# Patient Record
Sex: MALE | Race: WHITE | Employment: OTHER | URBAN - METROPOLITAN AREA
[De-identification: names, ages, dates, MRNs, and addresses within clinical notes are randomized per-mention and may not be internally consistent; named-entity substitution may affect disease eponyms.]

---

## 2019-08-06 ENCOUNTER — DOCUMENTATION ONLY (OUTPATIENT)
Dept: OTHER | Facility: CLINIC | Age: 64
End: 2019-08-06

## 2019-08-06 ENCOUNTER — NURSING HOME VISIT (OUTPATIENT)
Dept: GERIATRICS | Facility: CLINIC | Age: 64
End: 2019-08-06
Payer: MEDICARE

## 2019-08-06 VITALS
OXYGEN SATURATION: 95 % | TEMPERATURE: 98.5 F | RESPIRATION RATE: 24 BRPM | DIASTOLIC BLOOD PRESSURE: 69 MMHG | HEART RATE: 80 BPM | WEIGHT: 169.7 LBS | SYSTOLIC BLOOD PRESSURE: 118 MMHG

## 2019-08-06 DIAGNOSIS — J44.9 COPD, SEVERE (H): Primary | ICD-10-CM

## 2019-08-06 DIAGNOSIS — Z79.4 TYPE 2 DIABETES MELLITUS WITHOUT COMPLICATION, WITH LONG-TERM CURRENT USE OF INSULIN (H): ICD-10-CM

## 2019-08-06 DIAGNOSIS — G47.00 INSOMNIA, UNSPECIFIED TYPE: ICD-10-CM

## 2019-08-06 DIAGNOSIS — I10 ESSENTIAL HYPERTENSION: ICD-10-CM

## 2019-08-06 DIAGNOSIS — E11.9 TYPE 2 DIABETES MELLITUS WITHOUT COMPLICATION, WITH LONG-TERM CURRENT USE OF INSULIN (H): ICD-10-CM

## 2019-08-06 DIAGNOSIS — Z51.5 HOSPICE CARE PATIENT: ICD-10-CM

## 2019-08-06 DIAGNOSIS — F10.11 HISTORY OF ETOH ABUSE: ICD-10-CM

## 2019-08-06 DIAGNOSIS — Z72.0 TOBACCO ABUSE: ICD-10-CM

## 2019-08-06 DIAGNOSIS — J18.9 PNEUMONIA OF RIGHT LUNG DUE TO INFECTIOUS ORGANISM, UNSPECIFIED PART OF LUNG: ICD-10-CM

## 2019-08-06 PROCEDURE — 99309 SBSQ NF CARE MODERATE MDM 30: CPT | Mod: GW | Performed by: NURSE PRACTITIONER

## 2019-08-06 RX ORDER — DEXAMETHASONE 0.5 MG/1
0.5 TABLET ORAL 2 TIMES DAILY WITH MEALS
COMMUNITY
Start: 2019-08-02 | End: 2019-08-15

## 2019-08-06 RX ORDER — CEFUROXIME AXETIL 500 MG/1
500 TABLET ORAL 2 TIMES DAILY
COMMUNITY
End: 2019-08-15

## 2019-08-06 RX ORDER — ALBUTEROL SULFATE 90 UG/1
1 AEROSOL, METERED RESPIRATORY (INHALATION) EVERY 6 HOURS PRN
COMMUNITY

## 2019-08-06 RX ORDER — DEXAMETHASONE 0.5 MG/1
0.5 TABLET ORAL EVERY OTHER DAY
COMMUNITY
Start: 2019-11-11 | End: 2020-09-02

## 2019-08-06 RX ORDER — GUAIFENESIN 600 MG/1
1200 TABLET, EXTENDED RELEASE ORAL 2 TIMES DAILY PRN
COMMUNITY

## 2019-08-06 RX ORDER — CIPROFLOXACIN 750 MG/1
750 TABLET, FILM COATED ORAL 2 TIMES DAILY
COMMUNITY
End: 2019-08-15

## 2019-08-06 RX ORDER — LORAZEPAM 0.5 MG/1
0.5 TABLET ORAL 2 TIMES DAILY PRN
COMMUNITY
End: 2019-08-15

## 2019-08-06 RX ORDER — TRAZODONE HYDROCHLORIDE 50 MG/1
TABLET, FILM COATED ORAL
COMMUNITY

## 2019-08-06 RX ORDER — FUROSEMIDE 40 MG
40 TABLET ORAL 2 TIMES DAILY
COMMUNITY

## 2019-08-06 RX ORDER — POTASSIUM CHLORIDE 750 MG/1
10 TABLET, EXTENDED RELEASE ORAL DAILY
COMMUNITY
End: 2019-08-15

## 2019-08-06 RX ORDER — LISINOPRIL 40 MG/1
40 TABLET ORAL DAILY
COMMUNITY

## 2019-08-06 RX ORDER — LORAZEPAM 2 MG/ML
2 CONCENTRATE ORAL EVERY 4 HOURS PRN
COMMUNITY
End: 2019-08-15

## 2019-08-06 RX ORDER — ALBUTEROL SULFATE 90 UG/1
1 AEROSOL, METERED RESPIRATORY (INHALATION) EVERY 4 HOURS PRN
COMMUNITY

## 2019-08-06 RX ORDER — BISACODYL 10 MG
10 SUPPOSITORY, RECTAL RECTAL DAILY PRN
COMMUNITY

## 2019-08-06 NOTE — LETTER
8/6/2019        RE: Brooks Loo  [address] [facility] [address]        [place] [facility]  PRIMARY CARE PROVIDER AND CLINIC:  No primary care provider on file., No primary physician on file.  Chief Complaint   Patient presents with     [facility] F/U     [facility] Medical Record Number:  [id number]  Place of Service where encounter took place:  [facility] ([facility]) [[id number]]    Brooks Loo  is a 64 year old  (1955), [facility].  Admitted to this facility for  rehab, medical management, nursing care and hospice.    HPI:    HPI information obtained from: facility chart records, facility staff, patient report and Care Everywhere Epic chart review.     BRIEF HOSPITAL COURSE: Brooks Loo is a 64 y.o. male with history of end-stage oxygen and steroid dependent COPD, thrombocytopenia, hypertension, anxiety and depression pancreatitis and elevated liver enzymes who was [place] [facility] [place] with complaints of increased shortness of breath and right sided abdominal pain since his recent discharge, and despite ongoing Levaquin and Decadron.  Diagnosed with hospital acquired pneumonia with COPD exacerbation. Was discharged with ceftin, cipro and fluconazole.     Patient admitted to LTC on [facility] hospice.   Met with patient in his room today. He is alert and talkative.   He feels his breathing is stable.   His main concern is insomnia. This is chronic, but worse since hospitalization. He would like something to help with this. He reports chronic joint pain due to arthritis. Pain is at baseline. No other concerns today.         CODE STATUS/ADVANCE DIRECTIVES DISCUSSION:   DNR / DNI  Patient's living condition: lives with spouse  ALLERGIES: Cephalexin; Duoneb; and Prednisone  PAST MEDICAL HISTORY:  has no past medical history on file.  PAST SURGICAL HISTORY:   has no past surgical history on file.  FAMILY HISTORY: family history is not on  file.  SOCIAL HISTORY:       Post Discharge Medication Reconciliation Status: discharge medications reconciled, continue medications without change    Current Outpatient Medications   Medication Sig Dispense Refill     albuterol (PROAIR HFA/PROVENTIL HFA/VENTOLIN HFA) 108 (90 Base) MCG/ACT inhaler Inhale 1 puff into the lungs every 4 hours as needed for shortness of breath / dyspnea or wheezing       albuterol (PROAIR HFA/PROVENTIL HFA/VENTOLIN HFA) 108 (90 Base) MCG/ACT inhaler Inhale 1 puff into the lungs every 6 hours as needed for shortness of breath / dyspnea or wheezing       bisacodyl (DULCOLAX) 10 MG suppository Place 10 mg rectally daily as needed for constipation       cefuroxime (CEFTIN) 500 MG tablet Take 500 mg by mouth 2 times daily       ciprofloxacin (CIPRO) 750 MG tablet Take 750 mg by mouth 2 times daily       dexamethasone (DECADRON) 0.5 MG tablet Take 0.5 mg by mouth daily (with breakfast)       dexamethasone (DECADRON) 0.5 MG tablet Take 0.5 mg by mouth 2 times daily (with meals)       FLUCONAZOLE PO Take 400 mg by mouth daily       fluticasone-vilanterol (BREO ELLIPTA) 200-25 MCG/INH inhaler Inhale 1 puff into the lungs daily       furosemide (LASIX) 40 MG tablet Take 40 mg by mouth daily       guaiFENesin (MUCINEX) 600 MG 12 hr tablet Take 1,200 mg by mouth 2 times daily       insulin aspart (NOVOLOG FLEXPEN) 100 UNIT/ML pen Inject 8 Units Subcutaneous 3 times daily (with meals)       insulin aspart (NOVOLOG FLEXPEN) 100 UNIT/ML pen Inject as per sliding scale: if 0 - 69 = see protoc See  hypoglycemia protocol; 70 - 149 = 0 units; 150 - 199 =  2 units; 200 - 249 = 4 units; 250 - 299 = 6 units; 300 -  349 = 8 units; 350 - 399 = 10 units; 400 - 999 = 12  units &call MD, subcutaneously before meals       insulin glargine (LANTUS PEN) 100 UNIT/ML pen Inject 15 Units Subcutaneous daily       lisinopril (PRINIVIL/ZESTRIL) 40 MG tablet Take 40 mg by mouth daily       LORazepam (ATIVAN) 0.5 MG tablet  Take 0.5 mg by mouth 2 times daily as needed for anxiety       LORazepam (LORAZEPAM INTENSOL) 2 MG/ML (HIGH CONC) solution Take 2 mg by mouth every 4 hours as needed for anxiety       magnesium hydroxide (MILK OF MAGNESIA) 400 MG/5ML suspension Take 30 mLs by mouth daily as needed for constipation or heartburn       nicotine (NICOTROL) 10 MG inhaler Inhale 1 Cartridge into the lungs every hour as needed for smoking cessation       potassium chloride ER (K-DUR/KLOR-CON M) 10 MEQ CR tablet Take 10 mEq by mouth daily Every Mon, Wed, Fri       tiotropium (SPIRIVA RESPIMAT) 2.5 MCG/ACT inhaler Inhale 2 puffs into the lungs daily       traZODone (DESYREL) 50 MG tablet Take 50 mg by mouth At Bedtime       Tuberculin PPD (TUBERSOL ID) Inject 0.1 ml intradermally every evening  shift for give mantoux step 2         ROS:  4 point ROS including Respiratory, CV, GI and , other than that noted in the HPI,  is negative    Vitals:  /69   Pulse 80   Temp 98.5  F (36.9  C)   Resp 24   Wt 77 kg (169 lb 11.2 oz)   SpO2 95%   Exam:  GENERAL APPEARANCE:  Alert, in no distress  RESP:  respiratory effort and palpation of chest normal, diminished breath sounds t/o  CV:  Palpation and auscultation of heart done , regular rate and rhythm, no murmur, rub, or gallop  ABDOMEN:  no guarding or rebound, bowel sounds normal  SKIN:  Inspection of skin and subcutaneous tissue baseline  PSYCH:  oriented X 3, affect and mood normal    Lab/Diagnostic data:      ASSESSMENT/PLAN:  COPD, severe (H)  Pneumonia of right lung due to infectious organism, unspecified part of lung  Insomnia, unspecified type  Hospice care patient  - multiple hospitalizations d/t to end stage lung disease. Has started on [facility] hospice and does not desire future hospitalizations.  - on continuous oxgen. Continue current med/ abx  regimen  - Not sleeping well due to air hunger. Will have a sleep log obtained. Consider increasing ativan or morphine at HS to aid with  air hunger    Tobacco abuse  - continues to smoke, Patient understands he should quit    Essential hypertension  - vitals stable, hx of hypokalemia  - continue ACE and KCL, will check labs    Type 2 diabetes mellitus without complication, with long-term current use of insulin (H)  - hyperglycemia d/t steroids  - A1C 7.6% 8/1/19  - continue insulin and BG checks    History of ETOH abuse  - sober x 8 years    transcribed by : Dari Kim  Orders:  1. Keep sleep log x 7 days  2. Update Hospice RN if pt c/o insomnia  3. BMP - dx: edema, hypokalemia      Electronically signed by:  GUERDA Lopez CNP                       Sincerely,        GUERDA Lopez CNP

## 2019-08-06 NOTE — PROGRESS NOTES
[place] [facility]  PRIMARY CARE PROVIDER AND CLINIC:  No primary care provider on file., No primary physician on file.  Chief Complaint   Patient presents with     Hospital F/U     Establish Care     [place] Medical Record Number:  [id number]  Place of Service where encounter took place:  [facility] ([facility]) [[id number]]    Brooks Loo  is a 64 year old  (1955), [facility].  Admitted to this facility for  rehab, medical management, nursing care and hospice.    HPI:    HPI information obtained from: facility chart records, facility staff, patient report and Care Everywhere Epic chart review.     BRIEF HOSPITAL COURSE: Brooks Loo is a 64 y.o. male with history of end-stage oxygen and steroid dependent COPD, thrombocytopenia, hypertension, anxiety and depression pancreatitis and elevated liver enzymes who was readmitted to [facility] 7/27/19 with complaints of increased shortness of breath and right sided abdominal pain since his recent discharge, and despite ongoing Levaquin and Decadron.  Diagnosed with hospital acquired pneumonia with COPD exacerbation. Was discharged with ceftin, cipro and fluconazole.     Patient admitted to LTC on [facility] hospice.   Met with patient in his room today. He is alert and talkative.   He feels his breathing is stable.   His main concern is insomnia. This is chronic, but worse since hospitalization. He would like something to help with this. He reports chronic joint pain due to arthritis. Pain is at baseline. No other concerns today.         CODE STATUS/ADVANCE DIRECTIVES DISCUSSION:   DNR / DNI  Patient's living condition: lives with spouse  ALLERGIES: Cephalexin; Duoneb; and Prednisone  PAST MEDICAL HISTORY:  has no past medical history on file.  PAST SURGICAL HISTORY:   has no past surgical history on file.  FAMILY HISTORY: family history is not on file.  SOCIAL HISTORY:       Post Discharge Medication Reconciliation Status: discharge  medications reconciled, continue medications without change    Current Outpatient Medications   Medication Sig Dispense Refill     albuterol (PROAIR HFA/PROVENTIL HFA/VENTOLIN HFA) 108 (90 Base) MCG/ACT inhaler Inhale 1 puff into the lungs every 4 hours as needed for shortness of breath / dyspnea or wheezing       albuterol (PROAIR HFA/PROVENTIL HFA/VENTOLIN HFA) 108 (90 Base) MCG/ACT inhaler Inhale 1 puff into the lungs every 6 hours as needed for shortness of breath / dyspnea or wheezing       bisacodyl (DULCOLAX) 10 MG suppository Place 10 mg rectally daily as needed for constipation       cefuroxime (CEFTIN) 500 MG tablet Take 500 mg by mouth 2 times daily       ciprofloxacin (CIPRO) 750 MG tablet Take 750 mg by mouth 2 times daily       dexamethasone (DECADRON) 0.5 MG tablet Take 0.5 mg by mouth daily (with breakfast)       dexamethasone (DECADRON) 0.5 MG tablet Take 0.5 mg by mouth 2 times daily (with meals)       FLUCONAZOLE PO Take 400 mg by mouth daily       fluticasone-vilanterol (BREO ELLIPTA) 200-25 MCG/INH inhaler Inhale 1 puff into the lungs daily       furosemide (LASIX) 40 MG tablet Take 40 mg by mouth daily       guaiFENesin (MUCINEX) 600 MG 12 hr tablet Take 1,200 mg by mouth 2 times daily       insulin aspart (NOVOLOG FLEXPEN) 100 UNIT/ML pen Inject 8 Units Subcutaneous 3 times daily (with meals)       insulin aspart (NOVOLOG FLEXPEN) 100 UNIT/ML pen Inject as per sliding scale: if 0 - 69 = see protoc See  hypoglycemia protocol; 70 - 149 = 0 units; 150 - 199 =  2 units; 200 - 249 = 4 units; 250 - 299 = 6 units; 300 -  349 = 8 units; 350 - 399 = 10 units; 400 - 999 = 12  units &call MD, subcutaneously before meals       insulin glargine (LANTUS PEN) 100 UNIT/ML pen Inject 15 Units Subcutaneous daily       lisinopril (PRINIVIL/ZESTRIL) 40 MG tablet Take 40 mg by mouth daily       LORazepam (ATIVAN) 0.5 MG tablet Take 0.5 mg by mouth 2 times daily as needed for anxiety       LORazepam (LORAZEPAM  INTENSOL) 2 MG/ML (HIGH CONC) solution Take 2 mg by mouth every 4 hours as needed for anxiety       magnesium hydroxide (MILK OF MAGNESIA) 400 MG/5ML suspension Take 30 mLs by mouth daily as needed for constipation or heartburn       nicotine (NICOTROL) 10 MG inhaler Inhale 1 Cartridge into the lungs every hour as needed for smoking cessation       potassium chloride ER (K-DUR/KLOR-CON M) 10 MEQ CR tablet Take 10 mEq by mouth daily Every Mon, Wed, Fri       tiotropium (SPIRIVA RESPIMAT) 2.5 MCG/ACT inhaler Inhale 2 puffs into the lungs daily       traZODone (DESYREL) 50 MG tablet Take 50 mg by mouth At Bedtime       Tuberculin PPD (TUBERSOL ID) Inject 0.1 ml intradermally every evening  shift for give mantoux step 2         ROS:  4 point ROS including Respiratory, CV, GI and , other than that noted in the HPI,  is negative    Vitals:  /69   Pulse 80   Temp 98.5  F (36.9  C)   Resp 24   Wt 77 kg (169 lb 11.2 oz)   SpO2 95%   Exam:  GENERAL APPEARANCE:  Alert, in no distress  RESP:  respiratory effort and palpation of chest normal, diminished breath sounds t/o  CV:  Palpation and auscultation of heart done , regular rate and rhythm, no murmur, rub, or gallop  ABDOMEN:  no guarding or rebound, bowel sounds normal  SKIN:  Inspection of skin and subcutaneous tissue baseline  PSYCH:  oriented X 3, affect and mood normal    Lab/Diagnostic data:      ASSESSMENT/PLAN:  COPD, severe (H)  Pneumonia of right lung due to infectious organism, unspecified part of lung  Insomnia, unspecified type  Hospice care patient  - multiple hospitalizations d/t to end stage lung disease. Has started on [facility] hospice and does not desire future hospitalizations.  - on continuous oxgen. Continue current med/ abx  regimen  - Not sleeping well due to air hunger. Will have a sleep log obtained. Consider increasing ativan or morphine at HS to aid with air hunger    Tobacco abuse  - continues to smoke, Patient understands he should  quit    Essential hypertension  - vitals stable, hx of hypokalemia  - continue ACE and KCL, will check labs    Type 2 diabetes mellitus without complication, with long-term current use of insulin (H)  - hyperglycemia d/t steroids  - A1C 7.6% 8/1/19  - continue insulin and BG checks    History of ETOH abuse  - sober x 8 years    transcribed by : Dari Kim  Orders:  1. Keep sleep log x 7 days  2. Update Hospice RN if pt c/o insomnia  3. BMP - dx: edema, hypokalemia      Electronically signed by:  GUERDA Lopez CNP

## 2019-08-08 ASSESSMENT — MIFFLIN-ST. JEOR: SCORE: 1513.41

## 2019-08-09 ENCOUNTER — HOSPITAL LABORATORY (OUTPATIENT)
Facility: OTHER | Age: 64
End: 2019-08-09

## 2019-08-09 LAB
ANION GAP SERPL CALCULATED.3IONS-SCNC: 6 MMOL/L (ref 3–14)
BUN SERPL-MCNC: 30 MG/DL (ref 7–30)
CALCIUM SERPL-MCNC: 9.3 MG/DL (ref 8.5–10.1)
CHLORIDE SERPL-SCNC: 99 MMOL/L (ref 94–109)
CO2 SERPL-SCNC: 31 MMOL/L (ref 20–32)
CREAT SERPL-MCNC: 0.78 MG/DL (ref 0.66–1.25)
GFR SERPL CREATININE-BSD FRML MDRD: >90 ML/MIN/{1.73_M2}
GLUCOSE SERPL-MCNC: 138 MG/DL (ref 70–99)
POTASSIUM SERPL-SCNC: 4.1 MMOL/L (ref 3.4–5.3)
SODIUM SERPL-SCNC: 136 MMOL/L (ref 133–144)

## 2019-08-12 ENCOUNTER — NURSING HOME VISIT (OUTPATIENT)
Dept: GERIATRICS | Facility: CLINIC | Age: 64
End: 2019-08-12
Payer: COMMERCIAL

## 2019-08-12 VITALS
HEIGHT: 66 IN | DIASTOLIC BLOOD PRESSURE: 62 MMHG | RESPIRATION RATE: 16 BRPM | OXYGEN SATURATION: 93 % | HEART RATE: 72 BPM | BODY MASS INDEX: 27.31 KG/M2 | TEMPERATURE: 98.5 F | WEIGHT: 169.9 LBS | SYSTOLIC BLOOD PRESSURE: 118 MMHG

## 2019-08-12 DIAGNOSIS — I10 ESSENTIAL HYPERTENSION: ICD-10-CM

## 2019-08-12 DIAGNOSIS — G47.00 INSOMNIA, UNSPECIFIED TYPE: ICD-10-CM

## 2019-08-12 DIAGNOSIS — J44.9 COPD, SEVERE (H): ICD-10-CM

## 2019-08-12 DIAGNOSIS — E11.9 TYPE 2 DIABETES MELLITUS WITHOUT COMPLICATION, WITH LONG-TERM CURRENT USE OF INSULIN (H): Primary | ICD-10-CM

## 2019-08-12 DIAGNOSIS — Z79.4 TYPE 2 DIABETES MELLITUS WITHOUT COMPLICATION, WITH LONG-TERM CURRENT USE OF INSULIN (H): Primary | ICD-10-CM

## 2019-08-12 DIAGNOSIS — Z51.5 HOSPICE CARE PATIENT: ICD-10-CM

## 2019-08-12 DIAGNOSIS — J96.10 CHRONIC RESPIRATORY FAILURE, UNSPECIFIED WHETHER WITH HYPOXIA OR HYPERCAPNIA (H): ICD-10-CM

## 2019-08-12 DIAGNOSIS — J18.9 PNEUMONIA OF RIGHT LUNG DUE TO INFECTIOUS ORGANISM, UNSPECIFIED PART OF LUNG: ICD-10-CM

## 2019-08-12 PROCEDURE — 99305 1ST NF CARE MODERATE MDM 35: CPT | Mod: GW | Performed by: FAMILY MEDICINE

## 2019-08-12 RX ORDER — IPRATROPIUM BROMIDE AND ALBUTEROL SULFATE 2.5; .5 MG/3ML; MG/3ML
1 SOLUTION RESPIRATORY (INHALATION) 3 TIMES DAILY
COMMUNITY

## 2019-08-12 NOTE — LETTER
8/12/2019        RE: Brooks Loo  [address] [facility] [address]        [place] [facility]  PRIMARY CARE PROVIDER AND CLINIC:  No primary care provider on file., No primary physician on file.  Chief Complaint   Patient presents with     Establish Care     [place] Medical Record Number:  [id number]  Place of Service where encounter took place:  [facility] ([facility] [[id number]]    Brooks Loo  is a 64 year old  (1955), [facility].  Admitted to this facility for  rehab, medical management, nursing care and hospice.    HPI:    HPI information obtained from: facility staff, patient report and [facility] chart review.   Brief Summary of Hospital Course:   -Patient with past medical history of advanced COPD oxygen and steroid-dependent, recent history of pancreatitis, to the hospital with a pneumonia and COPD exacerbation, due to poor prognosis, enrolled in hospice.   Updates on Status Since Skilled nursing Admission:   -Was seen and examined today.  Reports cough at baseline, occasional wheezing , on O2 4 liter all the times, gets CARLIN and increased O2 to 5-6 liter, at times O2 saturation is 91% on 2 liter only.   -Hospice nurse report trazodone was increased to 100 mg help with insomnia, was taken off Spiriva, on DuoNeb 3 times daily per patient request.   ===============================================================  CODE STATUS/ADVANCE DIRECTIVES DISCUSSION:   DNR / DNI  Patient's living condition: lives with spouse  ALLERGIES: Cephalexin; Duoneb; and Prednisone   Surgical History      Surgery Date Site/Laterality Comments   GA REPAIR PDA BY DIVISION <17 Y/O     9 months old had heart surgery for patent ductus     HERNIA REPAIR     9 months old and 2 repeat surgeries after that with mesh     SPINE SURGERY 74, 75   padilla ira for scoliosis age 16     COLONOSCOPY SCREENING 3/16/05   report not available but pt reports 3 polyps and +FHx colon cancer,  "repeat 5 yrs     COLONOSCOPY SURVEILLANCE 16   Hx polyps. hyperplastic polyps at this study, repeat 5 yrs.     Medical History      Medical History Date Comments   COPD (chronic obstructive pulmonary disease) (HC)       Hypertension       Depression   \"A little bit\"    Anxiety   \"A little bit\"   Hemorrhoid       GRISEL (acute kidney injury) () 5/3/2016     Family History      Medical History Relation Name Comments   COPD Father       Cancer Father   ?   Hypertension Father       Hypertension Mother       Other Mother   ALS  6 months after diagnosis, strong female family history of ALS   Hypertension Sister Stacy     Cancer-breast Sister Baylee     Cancer-colon Sister Baylee mid 50s     Relation Name Status Comments   Brother Rashaad Alive     Father        Mother        Sister Stacy Alive     Sister Baylee Alive     Social History      Tobacco Use Types Packs/Day Years Used Date   Light Tobacco Smoker Cigarettes 0.25 32     Smokeless Tobacco: Never Used           Tobacco Cessation: Ready to Quit: Yes; Counseling Given: No  Comments: 5 cigarettes a day now started smoking at age 16     Alcohol Use Drinks/Week oz/Week Comments   Yes 2 Cans of beer    0 Standard drinks or equivalent   2.0 2 beers on the weekend (20)     Sex Assigned at Birth Date Recorded   Not on file       Job Start Date Occupation Industry   Not on file Not on file Not on file     Travel History Travel Start Travel End   No recent travel history available.       Post Discharge Medication Reconciliation Status: discharge medications reconciled and changed, per note/orders (see AVS)  Current Outpatient Medications   Medication Sig Dispense Refill     albuterol (PROAIR HFA/PROVENTIL HFA/VENTOLIN HFA) 108 (90 Base) MCG/ACT inhaler Inhale 1 puff into the lungs every 4 hours as needed for shortness of breath / dyspnea or wheezing       albuterol (PROAIR HFA/PROVENTIL HFA/VENTOLIN HFA) 108 (90 Base) MCG/ACT inhaler Inhale 1 puff into " the lungs every 6 hours as needed for shortness of breath / dyspnea or wheezing       bisacodyl (DULCOLAX) 10 MG suppository Place 10 mg rectally daily as needed for constipation       cefuroxime (CEFTIN) 500 MG tablet Take 500 mg by mouth 2 times daily       ciprofloxacin (CIPRO) 750 MG tablet Take 750 mg by mouth 2 times daily       dexamethasone (DECADRON) 0.5 MG tablet Take 0.5 mg by mouth daily (with breakfast)       dexamethasone (DECADRON) 0.5 MG tablet Take 0.5 mg by mouth 2 times daily (with meals)       FLUCONAZOLE PO Take 400 mg by mouth daily       fluticasone-vilanterol (BREO ELLIPTA) 200-25 MCG/INH inhaler Inhale 1 puff into the lungs daily       furosemide (LASIX) 40 MG tablet Take 40 mg by mouth daily       guaiFENesin (MUCINEX) 600 MG 12 hr tablet Take 1,200 mg by mouth 2 times daily       insulin aspart (NOVOLOG FLEXPEN) 100 UNIT/ML pen Inject 8 Units Subcutaneous 3 times daily (with meals)       insulin aspart (NOVOLOG FLEXPEN) 100 UNIT/ML pen Inject as per sliding scale: if 0 - 69 = see protoc See  hypoglycemia protocol; 70 - 149 = 0 units; 150 - 199 =  2 units; 200 - 249 = 4 units; 250 - 299 = 6 units; 300 -  349 = 8 units; 350 - 399 = 10 units; 400 - 999 = 12  units &call MD, subcutaneously before meals       insulin glargine (LANTUS PEN) 100 UNIT/ML pen Inject 15 Units Subcutaneous daily       lisinopril (PRINIVIL/ZESTRIL) 40 MG tablet Take 40 mg by mouth daily       LORazepam (ATIVAN) 0.5 MG tablet Take 0.5 mg by mouth 2 times daily as needed for anxiety       LORazepam (LORAZEPAM INTENSOL) 2 MG/ML (HIGH CONC) solution Take 2 mg by mouth every 4 hours as needed for anxiety       magnesium hydroxide (MILK OF MAGNESIA) 400 MG/5ML suspension Take 30 mLs by mouth daily as needed for constipation or heartburn       nicotine (NICOTROL) 10 MG inhaler Inhale 1 Cartridge into the lungs every hour as needed for smoking cessation       potassium chloride ER (K-DUR/KLOR-CON M) 10 MEQ CR tablet Take 10  "mEq by mouth daily Every Mon, Wed, Fri       tiotropium (SPIRIVA RESPIMAT) 2.5 MCG/ACT inhaler Inhale 2 puffs into the lungs daily       traZODone (DESYREL) 50 MG tablet Take 50 mg by mouth At Bedtime       Tuberculin PPD (TUBERSOL ID) Inject 0.1 ml intradermally every evening  shift for give mantoux step 2       ROS:  10 point ROS of systems including Constitutional, Eyes, Respiratory, Cardiovascular, Gastroenterology, Genitourinary, Integumentary, Musculoskeletal, Psychiatric were all negative except for pertinent positives noted in my HPI.    Vitals:  /62   Pulse 72   Temp 98.5  F (36.9  C)   Resp 16   Ht 1.676 m (5' 6\")   Wt 77.1 kg (169 lb 14.4 oz)   SpO2 93%   BMI 27.42 kg/m     Exam:  GENERAL APPEARANCE:  in no distress, cooperative  ENT:  Mouth and posterior oropharynx normal, moist mucous membranes, oral mucosa moist, no lesion noted.   EYES:  EOMI, Pupil rounded and equal.  RESP:  lungs clear to auscultation   CV:  S1S2 audible, regular HR, no murmur appreciated.   ABDOMEN:  soft, NT/ND, BS audible. no mass appreciated on palpation.   M/S:   no joint deformity noted on observation.   SKIN:  No rash.   NEURO:   No NFD appreciated on observation. Hand  5/5 b/l  PSYCH:  normal insight, judgement and memory, affect and mood normal    Lab/Diagnostic data: Reviewed in the chart and EHR.      ASSESSMENT/PLAN:  ------------------------------  Type 2 diabetes mellitus without complication, with long-term current use of insulin (H)  - HbA1C 7.6% (August 2019). Over controlled.   -  In this frail elderly adult with a limited life expectancy, the goal of  the management is to address the hyperglycemia sx if any,  rather than the  BGs numbers per se.   - on SSI, consider stopping.     COPD, severe (H)  Pneumonia of right lung due to infectious organism, unspecified part of lung  Chronic Respiratory failure (H)  - on abx, multiple regiment for COPD.     Essential hypertension: BP on the lower side. " Consider reducing lisinopril to 20 mg.     Frailty: Significant  Deficits requiring NH placement. Requiring extensive assistance from nursing. Up for meals only o/w spends the day resting in bed    Insomnia, unspecified type: on trazodone.    Hospice care patient: Symptoms managed by [facility] and Hospice Team.    Orders: See above, otherwise, continue the rest of the current POC.     Electronically signed by:  Tessa Hansen MD                       Sincerely,        Tessa Hansen MD

## 2019-08-12 NOTE — PROGRESS NOTES
[facility]  PRIMARY CARE PROVIDER AND CLINIC:  No primary care provider on file., No primary physician on file.  Chief Complaint   Patient presents with     Establish Care     [facility] Medical Record Number:  [id number]  Place of Service where encounter took place:  [facility] [[id number]]    Brooks Loo  is a 64 year old  (1955), [facility].  Admitted to this facility for  rehab, medical management, nursing care and hospice.    HPI:    HPI information obtained from: facility staff, patient report and [facility] chart review.   Brief Summary of Hospital Course:   -Patient with past medical history of advanced COPD oxygen and steroid-dependent, recent history of pancreatitis, to the hospital with a pneumonia and COPD exacerbation, due to poor prognosis, enrolled in hospice.   Updates on Status Since Skilled nursing Admission:   -Was seen and examined today.  Reports cough at baseline, occasional wheezing , on O2 4 liter all the times, gets CARLIN and increased O2 to 5-6 liter, at times O2 saturation is 91% on 2 liter only.   -Hospice nurse report trazodone was increased to 100 mg help with insomnia, was taken off Spiriva, on DuoNeb 3 times daily per patient request.   ===============================================================  CODE STATUS/ADVANCE DIRECTIVES DISCUSSION:   DNR / DNI  Patient's living condition: lives with spouse  ALLERGIES: Cephalexin; Duoneb; and Prednisone   Surgical History      Surgery Date Site/Laterality Comments   ME REPAIR PDA BY DIVISION <17 Y/O     9 months old had heart surgery for patent ductus     HERNIA REPAIR     9 months old and 2 repeat surgeries after that with mesh     SPINE SURGERY 74, 75   padilla ira for scoliosis age 16     COLONOSCOPY SCREENING 3/16/05   report not available but pt reports 3 polyps and +FHx colon cancer, repeat 5 yrs     COLONOSCOPY SURVEILLANCE 2/21/14   Hx polyps. hyperplastic polyps at  "this study, repeat 5 yrs.     Medical History      Medical History Date Comments   COPD (chronic obstructive pulmonary disease) (HC)       Hypertension       Depression   \"A little bit\"    Anxiety   \"A little bit\"   Hemorrhoid       GRISEL (acute kidney injury) (HC) 5/3/2016     Family History      Medical History Relation Name Comments   COPD Father       Cancer Father   ?   Hypertension Father       Hypertension Mother       Other Mother   ALS  6 months after diagnosis, strong female family history of ALS   Hypertension Sister Stacy     Cancer-breast Sister Baylee     Cancer-colon Sister Baylee mid 50s     Relation Name Status Comments   Brother Rashaad Alive     Father        Mother        Sister Stacy Alive     Sister Baylee Alive     Social History      Tobacco Use Types Packs/Day Years Used Date   Light Tobacco Smoker Cigarettes 0.25 32     Smokeless Tobacco: Never Used           Tobacco Cessation: Ready to Quit: Yes; Counseling Given: No  Comments: 5 cigarettes a day now started smoking at age 16     Alcohol Use Drinks/Week oz/Week Comments   Yes 2 Cans of beer    0 Standard drinks or equivalent   2.0 2 beers on the weekend (20)     Sex Assigned at Birth Date Recorded   Not on file       Job Start Date Occupation Industry   Not on file Not on file Not on file     Travel History Travel Start Travel End   No recent travel history available.       Post Discharge Medication Reconciliation Status: discharge medications reconciled and changed, per note/orders (see AVS)  Current Outpatient Medications   Medication Sig Dispense Refill     albuterol (PROAIR HFA/PROVENTIL HFA/VENTOLIN HFA) 108 (90 Base) MCG/ACT inhaler Inhale 1 puff into the lungs every 4 hours as needed for shortness of breath / dyspnea or wheezing       albuterol (PROAIR HFA/PROVENTIL HFA/VENTOLIN HFA) 108 (90 Base) MCG/ACT inhaler Inhale 1 puff into the lungs every 6 hours as needed for shortness of breath / dyspnea or wheezing       " bisacodyl (DULCOLAX) 10 MG suppository Place 10 mg rectally daily as needed for constipation       cefuroxime (CEFTIN) 500 MG tablet Take 500 mg by mouth 2 times daily       ciprofloxacin (CIPRO) 750 MG tablet Take 750 mg by mouth 2 times daily       dexamethasone (DECADRON) 0.5 MG tablet Take 0.5 mg by mouth daily (with breakfast)       dexamethasone (DECADRON) 0.5 MG tablet Take 0.5 mg by mouth 2 times daily (with meals)       FLUCONAZOLE PO Take 400 mg by mouth daily       fluticasone-vilanterol (BREO ELLIPTA) 200-25 MCG/INH inhaler Inhale 1 puff into the lungs daily       furosemide (LASIX) 40 MG tablet Take 40 mg by mouth daily       guaiFENesin (MUCINEX) 600 MG 12 hr tablet Take 1,200 mg by mouth 2 times daily       insulin aspart (NOVOLOG FLEXPEN) 100 UNIT/ML pen Inject 8 Units Subcutaneous 3 times daily (with meals)       insulin aspart (NOVOLOG FLEXPEN) 100 UNIT/ML pen Inject as per sliding scale: if 0 - 69 = see protoc See  hypoglycemia protocol; 70 - 149 = 0 units; 150 - 199 =  2 units; 200 - 249 = 4 units; 250 - 299 = 6 units; 300 -  349 = 8 units; 350 - 399 = 10 units; 400 - 999 = 12  units &call MD, subcutaneously before meals       insulin glargine (LANTUS PEN) 100 UNIT/ML pen Inject 15 Units Subcutaneous daily       lisinopril (PRINIVIL/ZESTRIL) 40 MG tablet Take 40 mg by mouth daily       LORazepam (ATIVAN) 0.5 MG tablet Take 0.5 mg by mouth 2 times daily as needed for anxiety       LORazepam (LORAZEPAM INTENSOL) 2 MG/ML (HIGH CONC) solution Take 2 mg by mouth every 4 hours as needed for anxiety       magnesium hydroxide (MILK OF MAGNESIA) 400 MG/5ML suspension Take 30 mLs by mouth daily as needed for constipation or heartburn       nicotine (NICOTROL) 10 MG inhaler Inhale 1 Cartridge into the lungs every hour as needed for smoking cessation       potassium chloride ER (K-DUR/KLOR-CON M) 10 MEQ CR tablet Take 10 mEq by mouth daily Every Mon, Wed, Fri       tiotropium (SPIRIVA RESPIMAT) 2.5 MCG/ACT  "inhaler Inhale 2 puffs into the lungs daily       traZODone (DESYREL) 50 MG tablet Take 50 mg by mouth At Bedtime       Tuberculin PPD (TUBERSOL ID) Inject 0.1 ml intradermally every evening  shift for give mantoux step 2       ROS:  10 point ROS of systems including Constitutional, Eyes, Respiratory, Cardiovascular, Gastroenterology, Genitourinary, Integumentary, Musculoskeletal, Psychiatric were all negative except for pertinent positives noted in my HPI.    Vitals:  /62   Pulse 72   Temp 98.5  F (36.9  C)   Resp 16   Ht 1.676 m (5' 6\")   Wt 77.1 kg (169 lb 14.4 oz)   SpO2 93%   BMI 27.42 kg/m    Exam:  GENERAL APPEARANCE:  in no distress, cooperative  ENT:  Mouth and posterior oropharynx normal, moist mucous membranes, oral mucosa moist, no lesion noted.   EYES:  EOMI, Pupil rounded and equal.  RESP:  lungs clear to auscultation   CV:  S1S2 audible, regular HR, no murmur appreciated.   ABDOMEN:  soft, NT/ND, BS audible. no mass appreciated on palpation.   M/S:   no joint deformity noted on observation.   SKIN:  No rash.   NEURO:   No NFD appreciated on observation. Hand  5/5 b/l  PSYCH:  normal insight, judgement and memory, affect and mood normal    Lab/Diagnostic data: Reviewed in the chart and EHR.      ASSESSMENT/PLAN:  ------------------------------  Type 2 diabetes mellitus without complication, with long-term current use of insulin (H)  - HbA1C 7.6% (August 2019). Over controlled.   -  In this frail elderly adult with a limited life expectancy, the goal of  the management is to address the hyperglycemia sx if any,  rather than the  BGs numbers per se.   - on SSI, consider stopping.     COPD, severe (H)  Pneumonia of right lung due to infectious organism, unspecified part of lung  Chronic Respiratory failure (H)  - on abx, multiple regiment for COPD.     Essential hypertension: BP on the lower side. Consider reducing lisinopril to 20 mg.     Frailty: Significant  Deficits requiring NH " placement. Requiring extensive assistance from nursing. Up for meals only o/w spends the day resting in bed    Insomnia, unspecified type: on trazodone.    Hospice care patient: Symptoms managed by [facility] and Hospice Team.    Orders: See above, otherwise, continue the rest of the current POC.     Electronically signed by:  Tessa Hansen MD

## 2019-08-14 VITALS
SYSTOLIC BLOOD PRESSURE: 118 MMHG | HEIGHT: 66 IN | RESPIRATION RATE: 16 BRPM | BODY MASS INDEX: 27.31 KG/M2 | OXYGEN SATURATION: 97 % | WEIGHT: 169.9 LBS | TEMPERATURE: 98.5 F | DIASTOLIC BLOOD PRESSURE: 62 MMHG | HEART RATE: 72 BPM

## 2019-08-14 ASSESSMENT — MIFFLIN-ST. JEOR: SCORE: 1513.41

## 2019-08-14 NOTE — PROGRESS NOTES
[facility]  [facility] Record Number:  [id number]  Place of Service where encounter took place:  [facility] [place] [[id number]]  Chief Complaint   Patient presents with     Nursing Home Acute       HPI:    Brooks Loo  is a 64 year old (1955), who is being seen today for an episodic care visit.  HPI information obtained from: facility chart records, facility staff and patient report.     Seeing patient today for a f/u.   Labs and vitals reviewed.   Met with patient in his room today. His Hospice RN is present.   Patient reports insomnia has improved with increase of trazodone.   Patient continue to use nicotrol inhaler. Patient feels he can wean off in the next month.       Past Medical and Surgical History reviewed in Epic today.    MEDICATIONS:  Current Outpatient Medications   Medication Sig Dispense Refill     albuterol (PROAIR HFA/PROVENTIL HFA/VENTOLIN HFA) 108 (90 Base) MCG/ACT inhaler Inhale 1-2 puffs into the lungs every 4 hours as needed for shortness of breath / dyspnea or wheezing        albuterol (PROAIR HFA/PROVENTIL HFA/VENTOLIN HFA) 108 (90 Base) MCG/ACT inhaler Inhale 1 puff into the lungs every 6 hours as needed for shortness of breath / dyspnea or wheezing       bisacodyl (DULCOLAX) 10 MG suppository Place 10 mg rectally daily as needed for constipation       dexamethasone (DECADRON) 0.5 MG tablet Take 0.5 mg by mouth daily (with breakfast)       fluticasone-vilanterol (BREO ELLIPTA) 200-25 MCG/INH inhaler Inhale 1 puff into the lungs daily       furosemide (LASIX) 40 MG tablet Take 40 mg by mouth daily       guaiFENesin (MUCINEX) 600 MG 12 hr tablet Take 1,200 mg by mouth 2 times daily       insulin aspart (NOVOLOG FLEXPEN) 100 UNIT/ML pen Inject 8 Units Subcutaneous 3 times daily (with meals)       insulin aspart (NOVOLOG FLEXPEN) 100 UNIT/ML pen Inject as per sliding scale: if 0 - 69 = see protoc See  hypoglycemia protocol; 70 - 149 = 0 units;  "150 - 199 =  2 units; 200 - 249 = 4 units; 250 - 299 = 6 units; 300 -  349 = 8 units; 350 - 399 = 10 units; 400 - 999 = 12  units &call MD, subcutaneously before meals       insulin glargine (LANTUS PEN) 100 UNIT/ML pen Inject 15 Units Subcutaneous daily       ipratropium - albuterol 0.5 mg/2.5 mg/3 mL (DUONEB) 0.5-2.5 (3) MG/3ML neb solution Take 1 vial by nebulization 3 times daily And also 1 vial every 4 hours as needed       lisinopril (PRINIVIL/ZESTRIL) 40 MG tablet Take 40 mg by mouth daily       magnesium hydroxide (MILK OF MAGNESIA) 400 MG/5ML suspension Take 30 mLs by mouth daily as needed for constipation or heartburn       nicotine (NICOTROL) 10 MG inhaler Inhale 1 Cartridge into the lungs every hour as needed for smoking cessation       tiotropium (SPIRIVA RESPIMAT) 2.5 MCG/ACT inhaler Inhale 2 puffs into the lungs daily       traZODone (DESYREL) 50 MG tablet Take 100 mg by mouth At Bedtime          REVIEW OF SYSTEMS:  4 point ROS including Respiratory, CV, GI and , other than that noted in the HPI,  is negative    Objective:  /62   Pulse 72   Temp 98.5  F (36.9  C)   Resp 16   Ht 1.676 m (5' 6\")   Wt 77.1 kg (169 lb 14.4 oz)   SpO2 97%   BMI 27.42 kg/m    Exam:  GENERAL APPEARANCE:  Alert, in no distress    Labs:       Last Basic Metabolic Panel:  Recent Labs   Lab Test 08/09/19  1207      POTASSIUM 4.1   CHLORIDE 99   LOGAN 9.3   CO2 31   BUN 30   CR 0.78   *         ASSESSMENT/PLAN:  COPD, severe (H)  Tobacco abuse  Hospice care patient  - [facility] hosp[facility] following  - sx's stable  - continues to use nicotrol, hospice not able to provide, patient will have to purchase if he wants to continue    Essential hypertension  - stable, will stop KCL supplement     Insomnia, unspecified type  - has improved with increased trazadone  - patient or staff to update with concerns.       transcribed by : Dena Kim  Orders:  1. Discontinue KCL      Electronically signed " by:  GUERDA Lopez CNP

## 2019-08-15 ENCOUNTER — NURSING HOME VISIT (OUTPATIENT)
Dept: GERIATRICS | Facility: CLINIC | Age: 64
End: 2019-08-15
Payer: COMMERCIAL

## 2019-08-15 DIAGNOSIS — G47.00 INSOMNIA, UNSPECIFIED TYPE: ICD-10-CM

## 2019-08-15 DIAGNOSIS — J44.9 COPD, SEVERE (H): Primary | ICD-10-CM

## 2019-08-15 DIAGNOSIS — Z72.0 TOBACCO ABUSE: ICD-10-CM

## 2019-08-15 DIAGNOSIS — I10 ESSENTIAL HYPERTENSION: ICD-10-CM

## 2019-08-15 DIAGNOSIS — Z51.5 HOSPICE CARE PATIENT: ICD-10-CM

## 2019-08-15 PROCEDURE — 99308 SBSQ NF CARE LOW MDM 20: CPT | Mod: GW | Performed by: NURSE PRACTITIONER

## 2019-08-15 NOTE — LETTER
8/15/2019        RE: Brooks Loo  [address] [facility] [address]        [facility]  [facility] Medical Record Number:  [id number]  Place of Service where encounter took place[facility] [[id number]]  Chief Complaint   Patient presents with     Nursing Home Acute       HPI:    Brooks Loo  is a 64 year old (1955), who is being seen today for an episodic care visit.  HPI information obtained from: facility chart records, facility staff and patient report.     Seeing patient today for a f/u.   Labs and vitals reviewed.   Met with patient in his room today. His Hospice RN is present.   Patient reports insomnia has improved with increase of trazodone.   Patient continue to use nicotrol inhaler. Patient feels he can wean off in the next month.       Past Medical and Surgical History reviewed in Epic today.    MEDICATIONS:  Current Outpatient Medications   Medication Sig Dispense Refill     albuterol (PROAIR HFA/PROVENTIL HFA/VENTOLIN HFA) 108 (90 Base) MCG/ACT inhaler Inhale 1-2 puffs into the lungs every 4 hours as needed for shortness of breath / dyspnea or wheezing        albuterol (PROAIR HFA/PROVENTIL HFA/VENTOLIN HFA) 108 (90 Base) MCG/ACT inhaler Inhale 1 puff into the lungs every 6 hours as needed for shortness of breath / dyspnea or wheezing       bisacodyl (DULCOLAX) 10 MG suppository Place 10 mg rectally daily as needed for constipation       dexamethasone (DECADRON) 0.5 MG tablet Take 0.5 mg by mouth daily (with breakfast)       fluticasone-vilanterol (BREO ELLIPTA) 200-25 MCG/INH inhaler Inhale 1 puff into the lungs daily       furosemide (LASIX) 40 MG tablet Take 40 mg by mouth daily       guaiFENesin (MUCINEX) 600 MG 12 hr tablet Take 1,200 mg by mouth 2 times daily       insulin aspart (NOVOLOG FLEXPEN) 100 UNIT/ML pen Inject 8 Units Subcutaneous 3 times daily (with meals)       insulin aspart (NOVOLOG FLEXPEN) 100 UNIT/ML pen Inject as  "per sliding scale: if 0 - 69 = see protoc See  hypoglycemia protocol; 70 - 149 = 0 units; 150 - 199 =  2 units; 200 - 249 = 4 units; 250 - 299 = 6 units; 300 -  349 = 8 units; 350 - 399 = 10 units; 400 - 999 = 12  units &call MD, subcutaneously before meals       insulin glargine (LANTUS PEN) 100 UNIT/ML pen Inject 15 Units Subcutaneous daily       ipratropium - albuterol 0.5 mg/2.5 mg/3 mL (DUONEB) 0.5-2.5 (3) MG/3ML neb solution Take 1 vial by nebulization 3 times daily And also 1 vial every 4 hours as needed       lisinopril (PRINIVIL/ZESTRIL) 40 MG tablet Take 40 mg by mouth daily       magnesium hydroxide (MILK OF MAGNESIA) 400 MG/5ML suspension Take 30 mLs by mouth daily as needed for constipation or heartburn       nicotine (NICOTROL) 10 MG inhaler Inhale 1 Cartridge into the lungs every hour as needed for smoking cessation       tiotropium (SPIRIVA RESPIMAT) 2.5 MCG/ACT inhaler Inhale 2 puffs into the lungs daily       traZODone (DESYREL) 50 MG tablet Take 100 mg by mouth At Bedtime          REVIEW OF SYSTEMS:  4 point ROS including Respiratory, CV, GI and , other than that noted in the HPI,  is negative    Objective:  /62   Pulse 72   Temp 98.5  F (36.9  C)   Resp 16   Ht 1.676 m (5' 6\")   Wt 77.1 kg (169 lb 14.4 oz)   SpO2 97%   BMI 27.42 kg/m     Exam:  GENERAL APPEARANCE:  Alert, in no distress    Labs:       Last Basic Metabolic Panel:  Recent Labs   Lab Test 08/09/19  1207      POTASSIUM 4.1   CHLORIDE 99   LOGAN 9.3   CO2 31   BUN 30   CR 0.78   *         ASSESSMENT/PLAN:  COPD, severe (H)  Tobacco abuse  Hospice care patient  - [facility] hospice following  - sx's stable  - continues to use nicotrol, hospice not able to provide, patient will have to purchase if he wants to continue    Essential hypertension  - stable, will stop KCL supplement     Insomnia, unspecified type  - has improved with increased trazadone  - patient or staff to update with concerns.       transcribed by " : Dena Kim  Orders:  1. Discontinue KCL      Electronically signed by:  GUERDA Lopez CNP             Sincerely,        GUERDA Lopez CNP

## 2019-10-09 ASSESSMENT — MIFFLIN-ST. JEOR: SCORE: 1589.16

## 2019-10-14 ENCOUNTER — NURSING HOME VISIT (OUTPATIENT)
Dept: GERIATRICS | Facility: CLINIC | Age: 64
End: 2019-10-14
Payer: MEDICARE

## 2019-10-14 VITALS
HEIGHT: 66 IN | HEART RATE: 72 BPM | OXYGEN SATURATION: 94 % | BODY MASS INDEX: 29.99 KG/M2 | SYSTOLIC BLOOD PRESSURE: 118 MMHG | DIASTOLIC BLOOD PRESSURE: 62 MMHG | WEIGHT: 186.6 LBS | RESPIRATION RATE: 16 BRPM | TEMPERATURE: 98.5 F

## 2019-10-14 DIAGNOSIS — Z79.4 TYPE 2 DIABETES MELLITUS WITHOUT COMPLICATION, WITH LONG-TERM CURRENT USE OF INSULIN (H): ICD-10-CM

## 2019-10-14 DIAGNOSIS — G47.00 INSOMNIA, UNSPECIFIED TYPE: ICD-10-CM

## 2019-10-14 DIAGNOSIS — R54 FRAIL ELDERLY: ICD-10-CM

## 2019-10-14 DIAGNOSIS — J44.9 COPD, SEVERE (H): Primary | ICD-10-CM

## 2019-10-14 DIAGNOSIS — I10 ESSENTIAL HYPERTENSION: ICD-10-CM

## 2019-10-14 DIAGNOSIS — Z51.5 HOSPICE CARE PATIENT: ICD-10-CM

## 2019-10-14 DIAGNOSIS — E11.9 TYPE 2 DIABETES MELLITUS WITHOUT COMPLICATION, WITH LONG-TERM CURRENT USE OF INSULIN (H): ICD-10-CM

## 2019-10-14 PROCEDURE — 99309 SBSQ NF CARE MODERATE MDM 30: CPT | Mod: GW | Performed by: FAMILY MEDICINE

## 2019-10-14 RX ORDER — MORPHINE SULFATE 20 MG/5ML
5 SOLUTION ORAL EVERY 4 HOURS PRN
COMMUNITY

## 2019-10-14 NOTE — PROGRESS NOTES
[facility]  Chief Complaint   Patient presents with     residential Regulatory     [facility] [facility] Record Number:  [id number]  [facility] of Service where encounter took place[facility] [[id number]]    HPI:    Brooks Loo  is 64 year old (1955), who is being seen today for a federally mandated E/M visit.  HPI information obtained from: facility staff, patient report and [facility] chart review.     Today's concerns are:  -  - Resident seen and examined. reports chornic right hip pain, hx of spinal surgery and scoliosis, can walk up to 200 feet then start getting aching pain, better with rest and pain meds.   - reports breathing is off and on, last week had a cough with sputum, wheezing at night, runny nose and eyes, and sore throat, now improving.   - denies fever or chills.   - reports appetite and sleep are fine  - --------------------------------  - - Past Medical, social, family histories, medications, and allergies reviewed and updated  - Medications reviewed: in the chart and EHR.   - Case Management:   I have reviewed the care plan and MDS and do agree with the plan. Patient's desire to return to the community is not present.  Information reviewed:  Medications, vital signs, orders, and nursing notes.    MEDICATIONS:  Current Outpatient Medications   Medication Sig Dispense Refill     acetaminophen (TYLENOL) 500 MG tablet Take 500 mg by mouth every 4 hours as needed for mild pain        albuterol (PROAIR HFA/PROVENTIL HFA/VENTOLIN HFA) 108 (90 Base) MCG/ACT inhaler Inhale 1 puff into the lungs every 6 hours as needed for shortness of breath / dyspnea or wheezing        bisacodyl (DULCOLAX) 10 MG suppository Place 10 mg rectally daily as needed for constipation       dexamethasone (DECADRON) 0.5 MG tablet Take 0.5 mg by mouth daily (with breakfast)       furosemide (LASIX) 40 MG tablet Take 40 mg by mouth daily       guaiFENesin (MUCINEX) 600 MG 12 hr  "tablet Take 1,200 mg by mouth 2 times daily       ipratropium - albuterol 0.5 mg/2.5 mg/3 mL (DUONEB) 0.5-2.5 (3) MG/3ML neb solution Take 1 vial by nebulization 3 times daily And also 1 vial every 4 hours as needed       lisinopril (PRINIVIL/ZESTRIL) 40 MG tablet Take 40 mg by mouth daily       magnesium hydroxide (MILK OF MAGNESIA) 400 MG/5ML suspension Take 30 mLs by mouth daily as needed for constipation or heartburn       morphine sulfate 20 MG/5ML SOLN Take 5 mg by mouth every 4 hours as needed       nicotine (NICOTROL) 10 MG inhaler Inhale 1 Cartridge into the lungs every hour as needed for smoking cessation       traZODone (DESYREL) 50 MG tablet Take 100 mg by mouth At Bedtime        albuterol (PROAIR HFA/PROVENTIL HFA/VENTOLIN HFA) 108 (90 Base) MCG/ACT inhaler Inhale 1 puff into the lungs every 6 hours as needed for shortness of breath / dyspnea or wheezing       fluticasone-vilanterol (BREO ELLIPTA) 200-25 MCG/INH inhaler Inhale 1 puff into the lungs daily       insulin aspart (NOVOLOG FLEXPEN) 100 UNIT/ML pen Inject 8 Units Subcutaneous 3 times daily (with meals)       insulin aspart (NOVOLOG FLEXPEN) 100 UNIT/ML pen Inject as per sliding scale: if 0 - 69 = see protoc See  hypoglycemia protocol; 70 - 149 = 0 units; 150 - 199 =  2 units; 200 - 249 = 4 units; 250 - 299 = 6 units; 300 -  349 = 8 units; 350 - 399 = 10 units; 400 - 999 = 12  units &call MD, subcutaneously before meals       insulin glargine (LANTUS PEN) 100 UNIT/ML pen Inject 15 Units Subcutaneous daily       tiotropium (SPIRIVA RESPIMAT) 2.5 MCG/ACT inhaler Inhale 2 puffs into the lungs daily       ROS:  4 point ROS including Respiratory, CV, GI and , other than that noted in the HPI,  is negative    Vitals:  /62   Pulse 72   Temp 98.5  F (36.9  C)   Resp 16   Ht 1.676 m (5' 6\")   Wt 84.6 kg (186 lb 9.6 oz)   SpO2 94%   BMI 30.12 kg/m    Body mass index is 30.12 kg/m .  Exam:  GENERAL APPEARANCE:  in no distress, " cooperative  RESP:  lungs clear to auscultation but diminished at the bases  CV:  S1S2 audible, regular HR, no murmur appreciated.   ABDOMEN:  soft, NT/ND, BS audible. no mass appreciated on palpation.   M/S:   no joint deformity noted on observation.   SKIN:  No rash.   NEURO:   No NFD appreciated on observation. Hand  5/5 b/l  PSYCH:  normal insight, judgement and memory, affect and mood normal    Lab/Diagnostic data: no new lab to review.     ASSESSMENT/PLAN  ------------------------------   COPD, severe (H)  Chronic Respiratory failure (H)  - at baseline.      Type 2 diabetes mellitus without complication, with long-term current use of insulin (H)  - HbA1C 7.6% (August 2019). Over controlled. Off SSI    Essential hypertension:  BP on the lower side. Consider reducing lisinopril to 20 mg.      Frailty: Significant  Deficits requiring NH placement. Requiring extensive assistance from nursing. Up for meals only o/w spends the day resting in bed     Insomnia, unspecified type: on trazodone.    Frailty: Significant  Deficits requiring NH placement. Requiring extensive assistance from nursing. Up for meals only o/w spends the day resting in bed    Hospice care patient: Symptoms managed by [facility]NP and Hospice Team.     Orders: See above, otherwise, continue the rest of the current POC.       Electronically signed by:  Tessa Hansen MD

## 2019-10-14 NOTE — LETTER
10/14/2019        RE: Brooks Loo  [address] [facility] [address]        [place] [facility]  Chief Complaint   Patient presents with     halfway Regulatory     [place] Medical Record Number:  [id number]  Place of Service where encounter took place:  [facility] [[id number]]    HPI:    Brooks Loo  is 64 year old (1955), who is being seen today for a federally mandated E/M visit.  HPI information obtained from: facility staff, patient report and [facility] chart review.     Today's concerns are:  -  - Resident seen and examined. reports chornic right hip pain, hx of spinal surgery and scoliosis, can walk up to 200 feet then start getting aching pain, better with rest and pain meds.   - reports breathing is off and on, last week had a cough with sputum, wheezing at night, runny nose and eyes, and sore throat, now improving.   - denies fever or chills.   - reports appetite and sleep are fine  - --------------------------------  - - Past Medical, social, family histories, medications, and allergies reviewed and updated  - Medications reviewed: in the chart and EHR.   - Case Management:   I have reviewed the care plan and MDS and do agree with the plan. Patient's desire to return to the community is not present.  Information reviewed:  Medications, vital signs, orders, and nursing notes.    MEDICATIONS:  Current Outpatient Medications   Medication Sig Dispense Refill     acetaminophen (TYLENOL) 500 MG tablet Take 500 mg by mouth every 4 hours as needed for mild pain        albuterol (PROAIR HFA/PROVENTIL HFA/VENTOLIN HFA) 108 (90 Base) MCG/ACT inhaler Inhale 1 puff into the lungs every 6 hours as needed for shortness of breath / dyspnea or wheezing        bisacodyl (DULCOLAX) 10 MG suppository Place 10 mg rectally daily as needed for constipation       dexamethasone (DECADRON) 0.5 MG tablet Take 0.5 mg by mouth daily (with breakfast)       furosemide  "(LASIX) 40 MG tablet Take 40 mg by mouth daily       guaiFENesin (MUCINEX) 600 MG 12 hr tablet Take 1,200 mg by mouth 2 times daily       ipratropium - albuterol 0.5 mg/2.5 mg/3 mL (DUONEB) 0.5-2.5 (3) MG/3ML neb solution Take 1 vial by nebulization 3 times daily And also 1 vial every 4 hours as needed       lisinopril (PRINIVIL/ZESTRIL) 40 MG tablet Take 40 mg by mouth daily       magnesium hydroxide (MILK OF MAGNESIA) 400 MG/5ML suspension Take 30 mLs by mouth daily as needed for constipation or heartburn       morphine sulfate 20 MG/5ML SOLN Take 5 mg by mouth every 4 hours as needed       nicotine (NICOTROL) 10 MG inhaler Inhale 1 Cartridge into the lungs every hour as needed for smoking cessation       traZODone (DESYREL) 50 MG tablet Take 100 mg by mouth At Bedtime        albuterol (PROAIR HFA/PROVENTIL HFA/VENTOLIN HFA) 108 (90 Base) MCG/ACT inhaler Inhale 1 puff into the lungs every 6 hours as needed for shortness of breath / dyspnea or wheezing       fluticasone-vilanterol (BREO ELLIPTA) 200-25 MCG/INH inhaler Inhale 1 puff into the lungs daily       insulin aspart (NOVOLOG FLEXPEN) 100 UNIT/ML pen Inject 8 Units Subcutaneous 3 times daily (with meals)       insulin aspart (NOVOLOG FLEXPEN) 100 UNIT/ML pen Inject as per sliding scale: if 0 - 69 = see protoc See  hypoglycemia protocol; 70 - 149 = 0 units; 150 - 199 =  2 units; 200 - 249 = 4 units; 250 - 299 = 6 units; 300 -  349 = 8 units; 350 - 399 = 10 units; 400 - 999 = 12  units &call MD, subcutaneously before meals       insulin glargine (LANTUS PEN) 100 UNIT/ML pen Inject 15 Units Subcutaneous daily       tiotropium (SPIRIVA RESPIMAT) 2.5 MCG/ACT inhaler Inhale 2 puffs into the lungs daily       ROS:  4 point ROS including Respiratory, CV, GI and , other than that noted in the HPI,  is negative    Vitals:  /62   Pulse 72   Temp 98.5  F (36.9  C)   Resp 16   Ht 1.676 m (5' 6\")   Wt 84.6 kg (186 lb 9.6 oz)   SpO2 94%   BMI 30.12 kg/m   "   Body mass index is 30.12 kg/m .  Exam:  GENERAL APPEARANCE:  in no distress, cooperative  RESP:  lungs clear to auscultation but diminished at the bases  CV:  S1S2 audible, regular HR, no murmur appreciated.   ABDOMEN:  soft, NT/ND, BS audible. no mass appreciated on palpation.   M/S:   no joint deformity noted on observation.   SKIN:  No rash.   NEURO:   No NFD appreciated on observation. Hand  5/5 b/l  PSYCH:  normal insight, judgement and memory, affect and mood normal    Lab/Diagnostic data: no new lab to review.     ASSESSMENT/PLAN  ------------------------------   COPD, severe (H)  Chronic Respiratory failure (H)  - at baseline.      Type 2 diabetes mellitus without complication, with long-term current use of insulin (H)  - HbA1C 7.6% (August 2019). Over controlled. Off SSI    Essential hypertension:  BP on the lower side. Consider reducing lisinopril to 20 mg.      Frailty: Significant  Deficits requiring NH placement. Requiring extensive assistance from nursing. Up for meals only o/w spends the day resting in bed     Insomnia, unspecified type: on trazodone.    Frailty: Significant  Deficits requiring NH placement. Requiring extensive assistance from nursing. Up for meals only o/w spends the day resting in bed    Hospice care patient: Symptoms managed by  GNP and Hospice Team.     Orders: See above, otherwise, continue the rest of the current POC.       Electronically signed by:  Tessa Hansen MD      Sincerely,        Tessa Hansen MD

## 2019-11-11 ENCOUNTER — NURSING HOME VISIT (OUTPATIENT)
Dept: GERIATRICS | Facility: CLINIC | Age: 64
End: 2019-11-11
Payer: COMMERCIAL

## 2019-11-11 DIAGNOSIS — Z51.5 HOSPICE CARE PATIENT: ICD-10-CM

## 2019-11-11 DIAGNOSIS — J96.10 CHRONIC RESPIRATORY FAILURE, UNSPECIFIED WHETHER WITH HYPOXIA OR HYPERCAPNIA (H): ICD-10-CM

## 2019-11-11 DIAGNOSIS — G47.00 INSOMNIA, UNSPECIFIED TYPE: ICD-10-CM

## 2019-11-11 DIAGNOSIS — J44.9 COPD, SEVERE (H): Primary | ICD-10-CM

## 2019-11-11 PROCEDURE — 99309 SBSQ NF CARE MODERATE MDM 30: CPT | Performed by: FAMILY MEDICINE

## 2019-11-11 NOTE — PROGRESS NOTES
[place] [facility]  Chief Complaint   Patient presents with     FDC Regulatory     [place] Medical Record Number:  [id number]  Place of Service where encounter took place: [facility] [[id number]]     HPI:    Brooks Loo  is 64 year old (1955), who is being seen today for a regular visit.  HPI information obtained from: facility staff, patient report and [facility] chart review.     Today's concerns are:  -  - Resident seen and examined. Reports accepted to [facility] science donor- donating his body for science. Reports doing fine, at times his O2 is in mid 90's with 2 liters of O2. Gets SOB with exertion, otherwise feels doing ok. Wants to reduce steroid and other meds.   - reports sleeping is well.   - --------------------------------  - - Past Medical, social, family histories, medications, and allergies reviewed and updated  - Medications reviewed: in the chart and EHR.   - Case Management:   I have reviewed the care plan and MDS and do agree with the plan. Patient's desire to return to the community is not present.  Information reviewed:  Medications, vital signs, orders, and nursing notes.    MEDICATIONS:  Current Outpatient Medications   Medication Sig Dispense Refill     acetaminophen (TYLENOL) 500 MG tablet Take 500 mg by mouth every 4 hours as needed for mild pain        albuterol (PROAIR HFA/PROVENTIL HFA/VENTOLIN HFA) 108 (90 Base) MCG/ACT inhaler Inhale 1 puff into the lungs every 4 hours as needed for shortness of breath / dyspnea or wheezing        albuterol (PROAIR HFA/PROVENTIL HFA/VENTOLIN HFA) 108 (90 Base) MCG/ACT inhaler Inhale 1 puff into the lungs every 6 hours as needed for shortness of breath / dyspnea or wheezing       bisacodyl (DULCOLAX) 10 MG suppository Place 10 mg rectally daily as needed for constipation       dexamethasone (DECADRON) 0.5 MG tablet Take 0.5 mg by mouth every other day Then d/c       furosemide (LASIX) 40 MG tablet Take  40 mg by mouth every morning        guaiFENesin (MUCINEX) 600 MG 12 hr tablet Take 1,200 mg by mouth 2 times daily as needed        insulin aspart (NOVOLOG FLEXPEN) 100 UNIT/ML pen Inject 8 Units Subcutaneous 3 times daily (with meals)       insulin aspart (NOVOLOG FLEXPEN) 100 UNIT/ML pen Inject as per sliding scale: if 0 - 69 = see protoc See  hypoglycemia protocol; 70 - 149 = 0 units; 150 - 199 =  2 units; 200 - 249 = 4 units; 250 - 299 = 6 units; 300 -  349 = 8 units; 350 - 399 = 10 units; 400 - 999 = 12  units &call MD, subcutaneously before meals       insulin glargine (LANTUS PEN) 100 UNIT/ML pen Inject 15 Units Subcutaneous daily       ipratropium - albuterol 0.5 mg/2.5 mg/3 mL (DUONEB) 0.5-2.5 (3) MG/3ML neb solution Take 1 vial by nebulization 3 times daily And also 1 vial every 4 hours as needed       lisinopril (PRINIVIL/ZESTRIL) 40 MG tablet Take 40 mg by mouth daily       magnesium hydroxide (MILK OF MAGNESIA) 400 MG/5ML suspension Take 30 mLs by mouth daily as needed for constipation or heartburn       melatonin 5 MG tablet Take 5 mg by mouth At Bedtime       morphine sulfate 20 MG/5ML SOLN Take 5 mg by mouth every 4 hours as needed       nicotine (NICOTROL) 10 MG inhaler Inhale 1 Cartridge into the lungs every hour as needed for smoking cessation       traZODone (DESYREL) 50 MG tablet Take 50 mg by mouth daily for 7 days, then 25 mg by mouth daily for 7 days, then d/c       fluticasone-vilanterol (BREO ELLIPTA) 200-25 MCG/INH inhaler Inhale 1 puff into the lungs daily       tiotropium (SPIRIVA RESPIMAT) 2.5 MCG/ACT inhaler Inhale 2 puffs into the lungs daily       ROS:  4 point ROS including Respiratory, CV, GI and , other than that noted in the HPI,  is negative    Vitals:  There were no vitals taken for this visit.  There is no height or weight on file to calculate BMI.  Exam:  GENERAL APPEARANCE:  in no distress, cooperative  RESP:  lungs clear to auscultation but diminished at the bases  CV:   S1S2 audible, regular HR, no murmur appreciated.   ABDOMEN:  soft, NT/ND, BS audible. no mass appreciated on palpation.   M/S:   no joint deformity noted on observation.   SKIN:  No rash.   NEURO:   No NFD appreciated on observation. Hand  5/5 b/l  PSYCH:  normal insight, judgement and memory, affect and mood normal    Lab/Diagnostic data: no new lab to review.     ASSESSMENT/PLAN  ------------------------------   COPD, severe (H)  Chronic Respiratory failure (H)  Hospice care:  - continued to be at baseline. Will taper prednisone to stop per Rt's request.   - will reduce lasix and monitor.     Insomnia: will taper trazodone and start melatonin.        - transcribed by : Dari Kim  Orders:  1. Change Dexamethasone 0.5 mg daily to every other day X 7 days then stop (as per pt request)  2. Reduce Trazodone from 100 mg  To 50 mg daily x 7 days , then 25 mg x 7 days, then stop  3. Increase Melatonin to 5 mg   4. Change Mucinex from BID scheduled to BID PRN - dx: chest congestion/productive cough  5. Change Lasix from 40 mg  BID to 40 mg once daily in the morning (per pt request)      Electronically signed by:  Tessa Hansen MD

## 2019-11-11 NOTE — LETTER
11/11/2019        RE: Brooks Loo  [address] [facility] [address]        [place] [facility]  Chief Complaint   Patient presents with     alf Regulatory     [place] Medical Record Number:  [id number]  Place of Service where encounter took place: [facility] [[id number]]     HPI:    Brooks Loo  is 64 year old (1955), who is being seen today for a regular visit.  HPI information obtained from: facility staff, patient report and [facility] chart review.     Today's concerns are:  -  - Resident seen and examined. Reports accepted to [facility] science donor- donating his body for science. Reports doing fine, at times his O2 is in mid 90's with 2 liters of O2. Gets SOB with exertion, otherwise feels doing ok. Wants to reduce steroid and other meds.   - reports sleeping is well.   - --------------------------------  - - Past Medical, social, family histories, medications, and allergies reviewed and updated  - Medications reviewed: in the chart and EHR.   - Case Management:   I have reviewed the care plan and MDS and do agree with the plan. Patient's desire to return to the community is not present.  Information reviewed:  Medications, vital signs, orders, and nursing notes.    MEDICATIONS:  Current Outpatient Medications   Medication Sig Dispense Refill     acetaminophen (TYLENOL) 500 MG tablet Take 500 mg by mouth every 4 hours as needed for mild pain        albuterol (PROAIR HFA/PROVENTIL HFA/VENTOLIN HFA) 108 (90 Base) MCG/ACT inhaler Inhale 1 puff into the lungs every 4 hours as needed for shortness of breath / dyspnea or wheezing        albuterol (PROAIR HFA/PROVENTIL HFA/VENTOLIN HFA) 108 (90 Base) MCG/ACT inhaler Inhale 1 puff into the lungs every 6 hours as needed for shortness of breath / dyspnea or wheezing       bisacodyl (DULCOLAX) 10 MG suppository Place 10 mg rectally daily as needed for constipation       dexamethasone (DECADRON) 0.5 MG tablet  Take 0.5 mg by mouth every other day Then d/c       furosemide (LASIX) 40 MG tablet Take 40 mg by mouth every morning        guaiFENesin (MUCINEX) 600 MG 12 hr tablet Take 1,200 mg by mouth 2 times daily as needed        insulin aspart (NOVOLOG FLEXPEN) 100 UNIT/ML pen Inject 8 Units Subcutaneous 3 times daily (with meals)       insulin aspart (NOVOLOG FLEXPEN) 100 UNIT/ML pen Inject as per sliding scale: if 0 - 69 = see protoc See  hypoglycemia protocol; 70 - 149 = 0 units; 150 - 199 =  2 units; 200 - 249 = 4 units; 250 - 299 = 6 units; 300 -  349 = 8 units; 350 - 399 = 10 units; 400 - 999 = 12  units &call MD, subcutaneously before meals       insulin glargine (LANTUS PEN) 100 UNIT/ML pen Inject 15 Units Subcutaneous daily       ipratropium - albuterol 0.5 mg/2.5 mg/3 mL (DUONEB) 0.5-2.5 (3) MG/3ML neb solution Take 1 vial by nebulization 3 times daily And also 1 vial every 4 hours as needed       lisinopril (PRINIVIL/ZESTRIL) 40 MG tablet Take 40 mg by mouth daily       magnesium hydroxide (MILK OF MAGNESIA) 400 MG/5ML suspension Take 30 mLs by mouth daily as needed for constipation or heartburn       melatonin 5 MG tablet Take 5 mg by mouth At Bedtime       morphine sulfate 20 MG/5ML SOLN Take 5 mg by mouth every 4 hours as needed       nicotine (NICOTROL) 10 MG inhaler Inhale 1 Cartridge into the lungs every hour as needed for smoking cessation       traZODone (DESYREL) 50 MG tablet Take 50 mg by mouth daily for 7 days, then 25 mg by mouth daily for 7 days, then d/c       fluticasone-vilanterol (BREO ELLIPTA) 200-25 MCG/INH inhaler Inhale 1 puff into the lungs daily       tiotropium (SPIRIVA RESPIMAT) 2.5 MCG/ACT inhaler Inhale 2 puffs into the lungs daily       ROS:  4 point ROS including Respiratory, CV, GI and , other than that noted in the HPI,  is negative    Vitals:  There were no vitals taken for this visit.  There is no height or weight on file to calculate BMI.  Exam:  GENERAL APPEARANCE:  in no  distress, cooperative  RESP:  lungs clear to auscultation but diminished at the bases  CV:  S1S2 audible, regular HR, no murmur appreciated.   ABDOMEN:  soft, NT/ND, BS audible. no mass appreciated on palpation.   M/S:   no joint deformity noted on observation.   SKIN:  No rash.   NEURO:   No NFD appreciated on observation. Hand  5/5 b/l  PSYCH:  normal insight, judgement and memory, affect and mood normal    Lab/Diagnostic data: no new lab to review.     ASSESSMENT/PLAN  ------------------------------   COPD, severe (H)  Chronic Respiratory failure (H)  Hospice care:  - continued to be at baseline. Will taper prednisone to stop per Rt's request.   - will reduce lasix and monitor.     Insomnia: will taper trazodone and start melatonin.        - transcribed by : Dari Kim  Orders:  1. Change Dexamethasone 0.5 mg daily to every other day X 7 days then stop (as per pt request)  2. Reduce Trazodone from 100 mg  To 50 mg daily x 7 days , then 25 mg x 7 days, then stop  3. Increase Melatonin to 5 mg   4. Change Mucinex from BID scheduled to BID PRN - dx: chest congestion/productive cough  5. Change Lasix from 40 mg  BID to 40 mg once daily in the morning (per pt request)      Electronically signed by:  Tessa Hansen MD          Sincerely,        Tesas Hansen MD

## 2019-11-20 ASSESSMENT — MIFFLIN-ST. JEOR: SCORE: 1586.44

## 2019-11-21 ENCOUNTER — DISCHARGE SUMMARY NURSING HOME (OUTPATIENT)
Dept: GERIATRICS | Facility: CLINIC | Age: 64
End: 2019-11-21
Payer: COMMERCIAL

## 2019-11-21 VITALS
OXYGEN SATURATION: 92 % | WEIGHT: 186 LBS | BODY MASS INDEX: 29.89 KG/M2 | DIASTOLIC BLOOD PRESSURE: 79 MMHG | SYSTOLIC BLOOD PRESSURE: 137 MMHG | TEMPERATURE: 98.8 F | HEIGHT: 66 IN | RESPIRATION RATE: 16 BRPM | HEART RATE: 91 BPM

## 2019-11-21 DIAGNOSIS — Z72.0 TOBACCO ABUSE: ICD-10-CM

## 2019-11-21 DIAGNOSIS — F10.11 HISTORY OF ETOH ABUSE: ICD-10-CM

## 2019-11-21 DIAGNOSIS — J44.9 COPD, SEVERE (H): Primary | ICD-10-CM

## 2019-11-21 DIAGNOSIS — Z79.4 TYPE 2 DIABETES MELLITUS WITHOUT COMPLICATION, WITH LONG-TERM CURRENT USE OF INSULIN (H): ICD-10-CM

## 2019-11-21 DIAGNOSIS — J96.10 CHRONIC RESPIRATORY FAILURE, UNSPECIFIED WHETHER WITH HYPOXIA OR HYPERCAPNIA (H): ICD-10-CM

## 2019-11-21 DIAGNOSIS — G47.00 INSOMNIA, UNSPECIFIED TYPE: ICD-10-CM

## 2019-11-21 DIAGNOSIS — I10 ESSENTIAL HYPERTENSION: ICD-10-CM

## 2019-11-21 DIAGNOSIS — R60.0 LOCALIZED EDEMA: ICD-10-CM

## 2019-11-21 DIAGNOSIS — Z51.5 HOSPICE CARE PATIENT: ICD-10-CM

## 2019-11-21 DIAGNOSIS — E11.9 TYPE 2 DIABETES MELLITUS WITHOUT COMPLICATION, WITH LONG-TERM CURRENT USE OF INSULIN (H): ICD-10-CM

## 2019-11-21 PROCEDURE — 99316 NF DSCHRG MGMT 30 MIN+: CPT | Performed by: NURSE PRACTITIONER

## 2019-11-21 RX ORDER — FEXOFENADINE HCL 180 MG/1
600 TABLET ORAL 2 TIMES DAILY PRN
COMMUNITY

## 2019-11-21 RX ORDER — LORAZEPAM 1 MG/1
1 TABLET ORAL 2 TIMES DAILY
COMMUNITY
Start: 2019-11-08 | End: 2019-11-25

## 2019-11-21 NOTE — PROGRESS NOTES
[place] [facility] DISCHARGE SUMMARY  PATIENT'S NAME: Brooks Loo  YOB: 1955  MEDICAL RECORD NUMBER:  [id number]  Place of Service where encounter took place:  [facility] [[id number]]    PRIMARY CARE PROVIDER AND CLINIC RESPONSIBLE AFTER TRANSFER:   GUERDA Lopez CNP, [address] / [place] [address]    Non-[facility] Provider     Transferring providers: GUERDA Lopez CNP, Dr. Jade MD  Recent Hospitalization/ED:  [facility]  Date of SNF Admission: August / 06 / 2019  Date of SNF (anticipated) Discharge: November / 23 / 2019  Discharged to: [facility]  Cognitive Scores: BIMS: 15/15      CODE STATUS/ADVANCE DIRECTIVES DISCUSSION:  DNR   ALLERGIES: Cephalexin; Duoneb; and Prednisone    DISCHARGE DIAGNOSIS/NURSING FACILITY COURSE:   BRIEF HOSPITAL COURSE: Brooks Loo is a 64 y.o. male with history of end-stage oxygen and steroid dependent COPD, thrombocytopenia, hypertension, anxiety and depression pancreatitis and elevated liver enzymes who was readmitted to [facility] 7/[facility]/19 with complaints of increased shortness of breath and right sided abdominal pain since his recent discharge, and despite ongoing Levaquin and Decadron.  Diagnosed with hospital acquired pneumonia with COPD exacerbation. Was discharged with ceftin, cipro and fluconazole.      COPD, severe (H)  Chronic respiratory failure, unspecified whether with hypoxia or hypercapnia (H)  Tobacco abuse  Hospice care patient  -Patient is to follow-up prednisone per his request  -Patient has PRN albuterol MDI available, and is on scheduled duo nebs.  Breathing has been stable  -Patient remains on oxygen via nasal cannula  -Patient continues to smoke, educated patient today on the importance of cessation  -Patient will continue with hosp[facility] cares at [facility]  -Hospice will provide comfort meds upon transferred to  [facility]    Insomnia, unspecified type  -Patient reports no current sleep concerns.  Continue melatonin and trazodone    Type 2 diabetes mellitus without complication, with long-term current use of insulin (H)  - HbA1C 7.6% (August 2019). Over controlled. Off SSI      Essential hypertension  Localized edema  -Vitals have been stable.  Continue lisinopril and Lasix.  monitor for hypotension.     History of ETOH abuse  -Patient has been requesting beer [facility].  Patient states he will have more freedom to drink in new environment.  Advised patient to be cautious with multiple chronic conditions and medications could interfere with alcohol          Past Medical History:  has no past medical history on file.    Discharge Medications:  Current Outpatient Medications   Medication Sig Dispense Refill     acetaminophen (TYLENOL) 500 MG tablet Take 500 mg by mouth every 4 hours as needed for mild pain        albuterol (PROAIR HFA/PROVENTIL HFA/VENTOLIN HFA) 108 (90 Base) MCG/ACT inhaler Inhale 1 puff into the lungs every 4 hours as needed for shortness of breath / dyspnea or wheezing        albuterol (PROAIR HFA/PROVENTIL HFA/VENTOLIN HFA) 108 (90 Base) MCG/ACT inhaler Inhale 1 puff into the lungs every 6 hours as needed for shortness of breath / dyspnea or wheezing       bisacodyl (DULCOLAX) 10 MG suppository Place 10 mg rectally daily as needed for constipation       fexofenadine (ALLEGRA) 180 MG tablet Take 600 mg by mouth 2 times daily as needed for allergies       furosemide (LASIX) 40 MG tablet Take 40 mg by mouth 2 times daily        guaiFENesin (MUCINEX) 600 MG 12 hr tablet Take 1,200 mg by mouth 2 times daily as needed        ipratropium - albuterol 0.5 mg/2.5 mg/3 mL (DUONEB) 0.5-2.5 (3) MG/3ML neb solution Take 1 vial by nebulization 3 times daily And also 1 vial every 4 hours as needed       lisinopril (PRINIVIL/ZESTRIL) 40 MG tablet Take 40 mg by mouth daily       LORazepam (ATIVAN) 1  "MG tablet Take 1 mg by mouth 2 times daily       magnesium hydroxide (MILK OF MAGNESIA) 400 MG/5ML suspension Take 30 mLs by mouth daily as needed for constipation or heartburn       melatonin 5 MG tablet Take 5 mg by mouth At Bedtime       morphine sulfate 20 MG/5ML SOLN Take 5 mg by mouth every 4 hours as needed       nicotine (NICOTROL) 10 MG inhaler Inhale 1 Cartridge into the lungs every hour as needed for smoking cessation       traZODone (DESYREL) 50 MG tablet Take 50 mg by mouth daily for 7 days, then 25 mg by mouth daily for 7 days, then d/c       dexamethasone (DECADRON) 0.5 MG tablet Take 0.5 mg by mouth every other day Then d/c       fluticasone-vilanterol (BREO ELLIPTA) 200-25 MCG/INH inhaler Inhale 1 puff into the lungs daily       insulin aspart (NOVOLOG FLEXPEN) 100 UNIT/ML pen Inject 8 Units Subcutaneous 3 times daily (with meals)       insulin aspart (NOVOLOG FLEXPEN) 100 UNIT/ML pen Inject as per sliding scale: if 0 - 69 = see protoc See  hypoglycemia protocol; 70 - 149 = 0 units; 150 - 199 =  2 units; 200 - 249 = 4 units; 250 - 299 = 6 units; 300 -  349 = 8 units; 350 - 399 = 10 units; 400 - 999 = 12  units &call MD, subcutaneously before meals       insulin glargine (LANTUS PEN) 100 UNIT/ML pen Inject 15 Units Subcutaneous daily       tiotropium (SPIRIVA RESPIMAT) 2.5 MCG/ACT inhaler Inhale 2 puffs into the lungs daily         Controlled medications sent with patient:   not applicable/none     ROS:   4 point ROS including Respiratory, CV, GI and , other than that noted in the HPI,  is negative    Physical Exam:   Vitals: /79   Pulse 91   Temp 98.8  F (37.1  C)   Resp 16   Ht 1.676 m (5' 6\")   Wt 84.4 kg (186 lb)   SpO2 92%   BMI 30.02 kg/m    BMI= Body mass index is 30.02 kg/m .  GENERAL APPEARANCE:  Alert, in no distress  RESP:  lungs clear to auscultation , no respiratory distress  CV:  regular rate and rhythm, no murmur, rub, or gallop, Trace lower extremity edema  ABDOMEN:  " normal bowel sounds, soft, nontender, no hepatosplenomegaly or other masses  SKIN:  Inspection of skin and subcutaneous tissue baseline  PSYCH:  oriented X 3, insight and judgement impaired, affect and mood normal     SNF labs:   Most Recent 3 CBC's:No lab results found.  Most Recent 3 BMP's:  Recent Labs   Lab Test 08/09/19  1207      POTASSIUM 4.1   CHLORIDE 99   CO2 31   BUN 30   CR 0.78   ANIONGAP 6   LOGAN 9.3   *     Most Recent 3 Creatinines:  Recent Labs   Lab Test 08/09/19  1207   CR 0.78       DISCHARGE PLAN:    Follow up labs: No labs orders/due    Medical Follow Up:      Follow up with primary care provider in 3-5 days of discharge    MTM referral needed and placed by this provider: No    Current [facility] scheduled appointments:   none    Discharge Services: No therapy or home care recommended.     Discharge Instructions Verbalized to Patient at Discharge:     None    TOTAL DISCHARGE TIME:   Greater than 30 minutes  Electronically signed by:  GUERDA Lopez CNP

## 2019-11-21 NOTE — LETTER
11/21/2019        RE: Brooks Loo  [address] [facility] [place] [address]        [facility] DISCHARGE SUMMARY  PATIENT'S NAME: Brooks Loo  YOB: 1955  MEDICAL RECORD NUMBER:  [id number]  Place of Service where encounter took place:  [facility] [[id number]]    PRIMARY CARE PROVIDER AND CLINIC RESPONSIBLE AFTER TRANSFER:   GUERDA Lopez CNP, [address] [address] / [place] [address]    Non-[facility] Provider     Transferring providers: GUERDA Lopez CNP, Dr. Jade MD  Recent Hospitalization/ED:  [facility]  Date of SNF Admission: August / 06 / 2019  Date of SNF (anticipated) Discharge: November / 23 / 2019  Discharged to: [facility] - [facility]  Cognitive Scores: BIMS: 15/15      CODE STATUS/ADVANCE DIRECTIVES DISCUSSION:  DNR   ALLERGIES: Cephalexin; Duoneb; and Prednisone    DISCHARGE DIAGNOSIS/NURSING FACILITY COURSE:   BRIEF HOSPITAL COURSE: Brooks Loo is a 64 y.o. male with history of end-stage oxygen and steroid dependent COPD, thrombocytopenia, hypertension, anxiety and depression pancreatitis and elevated liver enzymes who was readmitted to [facility]/19 with complaints of increased shortness of breath and right sided abdominal pain since his recent discharge, and despite ongoing Levaquin and Decadron.  Diagnosed with hospital acquired pneumonia with COPD exacerbation. Was discharged with ceftin, cipro and fluconazole.      COPD, severe (H)  Chronic respiratory failure, unspecified whether with hypoxia or hypercapnia (H)  Tobacco abuse  Hospice care patient  -Patient is to follow-up prednisone per his request  -Patient has PRN albuterol MDI available, and is on scheduled duo nebs.  Breathing has been stable  -Patient remains on oxygen via nasal cannula  -Patient continues to smoke, educated patient today on the importance of cessation  -Patient will continue with hospice cares  at [facility]  -Hospice will provide comfort meds upon transferred to [facility]    Insomnia, unspecified type  -Patient reports no current sleep concerns.  Continue melatonin and trazodone    Type 2 diabetes mellitus without complication, with long-term current use of insulin (H)  - HbA1C 7.6% (August 2019). Over controlled. Off SSI      Essential hypertension  Localized edema  -Vitals have been stable.  Continue lisinopril and Lasix.  monitor for hypotension.     History of ETOH abuse  -Patient has been requesting beer [facility] facility.  Patient states he will have more freedom to drink in new environment.  Advised patient to be cautious with multiple chronic conditions and medications could interfere with alcohol          Past Medical History:  has no past medical history on file.    Discharge Medications:  Current Outpatient Medications   Medication Sig Dispense Refill     acetaminophen (TYLENOL) 500 MG tablet Take 500 mg by mouth every 4 hours as needed for mild pain        albuterol (PROAIR HFA/PROVENTIL HFA/VENTOLIN HFA) 108 (90 Base) MCG/ACT inhaler Inhale 1 puff into the lungs every 4 hours as needed for shortness of breath / dyspnea or wheezing        albuterol (PROAIR HFA/PROVENTIL HFA/VENTOLIN HFA) 108 (90 Base) MCG/ACT inhaler Inhale 1 puff into the lungs every 6 hours as needed for shortness of breath / dyspnea or wheezing       bisacodyl (DULCOLAX) 10 MG suppository Place 10 mg rectally daily as needed for constipation       fexofenadine (ALLEGRA) 180 MG tablet Take 600 mg by mouth 2 times daily as needed for allergies       furosemide (LASIX) 40 MG tablet Take 40 mg by mouth 2 times daily        guaiFENesin (MUCINEX) 600 MG 12 hr tablet Take 1,200 mg by mouth 2 times daily as needed        ipratropium - albuterol 0.5 mg/2.5 mg/3 mL (DUONEB) 0.5-2.5 (3) MG/3ML neb solution Take 1 vial by nebulization 3 times daily And also 1 vial every 4 hours as needed       lisinopril  "(PRINIVIL/ZESTRIL) 40 MG tablet Take 40 mg by mouth daily       LORazepam (ATIVAN) 1 MG tablet Take 1 mg by mouth 2 times daily       magnesium hydroxide (MILK OF MAGNESIA) 400 MG/5ML suspension Take 30 mLs by mouth daily as needed for constipation or heartburn       melatonin 5 MG tablet Take 5 mg by mouth At Bedtime       morphine sulfate 20 MG/5ML SOLN Take 5 mg by mouth every 4 hours as needed       nicotine (NICOTROL) 10 MG inhaler Inhale 1 Cartridge into the lungs every hour as needed for smoking cessation       traZODone (DESYREL) 50 MG tablet Take 50 mg by mouth daily for 7 days, then 25 mg by mouth daily for 7 days, then d/c       dexamethasone (DECADRON) 0.5 MG tablet Take 0.5 mg by mouth every other day Then d/c       fluticasone-vilanterol (BREO ELLIPTA) 200-25 MCG/INH inhaler Inhale 1 puff into the lungs daily       insulin aspart (NOVOLOG FLEXPEN) 100 UNIT/ML pen Inject 8 Units Subcutaneous 3 times daily (with meals)       insulin aspart (NOVOLOG FLEXPEN) 100 UNIT/ML pen Inject as per sliding scale: if 0 - 69 = see protoc See  hypoglycemia protocol; 70 - 149 = 0 units; 150 - 199 =  2 units; 200 - 249 = 4 units; 250 - 299 = 6 units; 300 -  349 = 8 units; 350 - 399 = 10 units; 400 - 999 = 12  units &call MD, subcutaneously before meals       insulin glargine (LANTUS PEN) 100 UNIT/ML pen Inject 15 Units Subcutaneous daily       tiotropium (SPIRIVA RESPIMAT) 2.5 MCG/ACT inhaler Inhale 2 puffs into the lungs daily         Controlled medications sent with patient:   not applicable/none     ROS:   4 point ROS including Respiratory, CV, GI and , other than that noted in the HPI,  is negative    Physical Exam:   Vitals: /79   Pulse 91   Temp 98.8  F (37.1  C)   Resp 16   Ht 1.676 m (5' 6\")   Wt 84.4 kg (186 lb)   SpO2 92%   BMI 30.02 kg/m     BMI= Body mass index is 30.02 kg/m .  GENERAL APPEARANCE:  Alert, in no distress  RESP:  lungs clear to auscultation , no respiratory distress  CV:  regular " rate and rhythm, no murmur, rub, or gallop, Trace lower extremity edema  ABDOMEN:  normal bowel sounds, soft, nontender, no hepatosplenomegaly or other masses  SKIN:  Inspection of skin and subcutaneous tissue baseline  PSYCH:  oriented X 3, insight and judgement impaired, affect and mood normal     SNF labs:   Most Recent 3 CBC's:No lab results found.  Most Recent 3 BMP's:  Recent Labs   Lab Test 08/09/19  1207      POTASSIUM 4.1   CHLORIDE 99   CO2 31   BUN 30   CR 0.78   ANIONGAP 6   LOGAN 9.3   *     Most Recent 3 Creatinines:  Recent Labs   Lab Test 08/09/19  1207   CR 0.78       DISCHARGE PLAN:    Follow up labs: No labs orders/due    Medical Follow Up:      Follow up with primary care provider in 3-5 days of discharge    MTM referral needed and placed by this provider: No    Current [place] scheduled appointments:   none    Discharge Services: No therapy or home care recommended.     Discharge Instructions Verbalized to Patient at Discharge:     None    TOTAL DISCHARGE TIME:   Greater than 30 minutes  Electronically signed by:  GUERDA Lopez CNP                   Sincerely,        GUERDA Lopez CNP

## 2020-09-02 ENCOUNTER — HOSPITAL ENCOUNTER (EMERGENCY)
Facility: CLINIC | Age: 65
Discharge: HOME | End: 2020-09-02
Attending: PHYSICIAN ASSISTANT | Admitting: PHYSICIAN ASSISTANT
Payer: COMMERCIAL

## 2020-09-02 VITALS
RESPIRATION RATE: 16 BRPM | BODY MASS INDEX: 28.08 KG/M2 | SYSTOLIC BLOOD PRESSURE: 104 MMHG | DIASTOLIC BLOOD PRESSURE: 66 MMHG | WEIGHT: 174 LBS | TEMPERATURE: 98.8 F | OXYGEN SATURATION: 97 % | HEART RATE: 83 BPM

## 2020-09-02 DIAGNOSIS — M79.672 LEFT FOOT PAIN: ICD-10-CM

## 2020-09-02 LAB — URATE SERPL-MCNC: 9.5 MG/DL (ref 3.5–7.2)

## 2020-09-02 PROCEDURE — 84550 ASSAY OF BLOOD/URIC ACID: CPT | Performed by: PHYSICIAN ASSISTANT

## 2020-09-02 PROCEDURE — 99214 OFFICE O/P EST MOD 30 MIN: CPT | Mod: Z6 | Performed by: PHYSICIAN ASSISTANT

## 2020-09-02 PROCEDURE — G0463 HOSPITAL OUTPT CLINIC VISIT: HCPCS | Performed by: PHYSICIAN ASSISTANT

## 2020-09-02 RX ORDER — INDOMETHACIN 50 MG/1
50 CAPSULE ORAL
Qty: 21 CAPSULE | Refills: 0 | Status: SHIPPED | OUTPATIENT
Start: 2020-09-02

## 2020-09-02 NOTE — ED AVS SNAPSHOT
[facility] Emergency Department  [address] [address]  Phone:  [phone]  Fax:  [phone]                                    Brooks Loo   MRN: [id number]    Department:  [facility] Emergency Department   Date of Visit:  9/2/2020           After Visit Summary Signature Page    I have received my discharge instructions, and my questions have been answered. I have discussed any challenges I see with this plan with the nurse or doctor.    ..........................................................................................................................................  Patient/Patient Representative Signature      ..........................................................................................................................................  Patient Representative Print Name and Relationship to Patient    ..................................................               ................................................  Date                                   Time    ..........................................................................................................................................  Reviewed by Signature/Title    ...................................................              ..............................................  Date                                               Time          22EPIC Rev 08/18

## 2020-09-03 NOTE — ED PROVIDER NOTES
History     Chief Complaint   Patient presents with     Foot Pain     left foot pain, started today      HPI  Brooks Loo is a 65 year old male who presents to the emergency department with concern over left foot pain which is been present since earlier this morning.  Patient complains of localized pain, swelling which is exacerbated by dorsiflexion of the ankle.  Pain will sometimes radiate up towards his left hip.  He denies any new distal numbness or paresthesias.  No overlying erythema rashes or skin changes.  No recent trauma to the area.  He does have severe COPD and is on 2 to 6 L of oxygen by nasal canula at baseline which has not changed.  He denies any recent fevers, new onset chills, myalgias, increased dyspnea or wheezing.  He has not attempted any OTC treatments directly for his foot pain.  He denies any recent surgeries.  No history of bleeding or clotting disorders, no hormone therapy, however significant other states concern over possible DVT due to his sleep position.      Allergies:  Allergies   Allergen Reactions     Cephalexin      Duoneb      Prednisone      Problem List:    There are no active problems to display for this patient.     Past Medical History:    No past medical history on file.    Past Surgical History:    No past surgical history on file.    Family History:    No family history on file.    Social History:  Marital Status:  Single [1]  Social History     Tobacco Use     Smoking status: Not on file   Substance Use Topics     Alcohol use: Not on file     Drug use: Not on file      Medications:    indomethacin (INDOCIN) 50 MG capsule  acetaminophen (TYLENOL) 500 MG tablet  albuterol (PROAIR HFA/PROVENTIL HFA/VENTOLIN HFA) 108 (90 Base) MCG/ACT inhaler  albuterol (PROAIR HFA/PROVENTIL HFA/VENTOLIN HFA) 108 (90 Base) MCG/ACT inhaler  bisacodyl (DULCOLAX) 10 MG suppository  fexofenadine (ALLEGRA) 180 MG tablet  fluticasone-vilanterol (BREO ELLIPTA) 200-25 MCG/INH  inhaler  furosemide (LASIX) 40 MG tablet  guaiFENesin (MUCINEX) 600 MG 12 hr tablet  insulin aspart (NOVOLOG FLEXPEN) 100 UNIT/ML pen  insulin aspart (NOVOLOG FLEXPEN) 100 UNIT/ML pen  insulin glargine (LANTUS PEN) 100 UNIT/ML pen  ipratropium - albuterol 0.5 mg/2.5 mg/3 mL (DUONEB) 0.5-2.5 (3) MG/3ML neb solution  lisinopril (PRINIVIL/ZESTRIL) 40 MG tablet  magnesium hydroxide (MILK OF MAGNESIA) 400 MG/5ML suspension  melatonin 5 MG tablet  morphine sulfate 20 MG/5ML SOLN  nicotine (NICOTROL) 10 MG inhaler  tiotropium (SPIRIVA RESPIMAT) 2.5 MCG/ACT inhaler  traZODone (DESYREL) 50 MG tablet      Review of Systems  CONSTITUTIONAL:NEGATIVE for fever, chills, change in weight  INTEGUMENTARY/SKIN: NEGATIVE for worrisome rashes, moles or lesions  RESP:NEGATIVE for new significant cough or SOB from baseline  MUSCULOSKELETAL: POSITIVE  for left foot pain and swelling and NEGATIVE for other concerning arthralgias or myalgias   NEURO: NEGATIVE for numbness, paresthesias  Physical Exam   BP: 104/66  Pulse: 83  Temp: 98.8  F (37.1  C)  Resp: 16  Weight: 78.9 kg (174 lb)  SpO2: 97 %  Physical Exam  Constitutional:       General: He is not in acute distress.  HENT:      Head: Normocephalic and atraumatic.   Musculoskeletal:      Left ankle: He exhibits normal range of motion, no swelling, no ecchymosis, no deformity, no laceration and normal pulse. No tenderness.      Left foot: Normal range of motion and normal capillary refill. Tenderness and swelling present. No bony tenderness, crepitus, deformity or laceration.        Feet:    Skin:     General: Skin is warm and dry.      Findings: Erythema present. No abrasion, bruising, ecchymosis or laceration.   Neurological:      Mental Status: He is alert and oriented to person, place, and time.      Sensory: No sensory deficit.       ED Course        Procedures        Critical Care time:  none        Results for orders placed or performed during the hospital encounter of 09/02/20  (from the past 24 hour(s))   Uric acid   Result Value Ref Range    Uric Acid 9.5 (H) 3.5 - 7.2 mg/dL     Medications - No data to display    Assessments & Plan (with Medical Decision Making)     I have reviewed the nursing notes.  I have reviewed the findings, diagnosis, plan and need for follow up with the patient.       New Prescriptions    INDOMETHACIN (INDOCIN) 50 MG CAPSULE    Take 1 capsule (50 mg) by mouth 3 times daily (with meals)     Final diagnoses:   Left foot pain     65-year-old male presents to the urgent care with concern over sudden onset of atraumatic left foot pain which began earlier today.  Upon arrival.  Physical exam findings as described above.  Part of evaluation patient did agree to have blood work drawn including uric acid which was elevated which supports the diagnosis of gout.  I did discuss possibility of cellulitis given erythema, warmth and risk/benefits of antibiotic and patient elected to defer at this time.  I have low suspicion for DVT however did have similar conversation regarding utility of ultrasound and patient elected to defer at this time.  He was discharged home stable with prescription for Indocin.  Follow up with PCP if no improvement in 48-72 hours  Worrisome reasons to return to ER/UC sooner discussed.     Disclaimer: This note consists of symbols derived from keyboarding, dictation, and/or voice recognition software. As a result, there may be errors in the script that have gone undetected.  Please consider this when interpreting information found in the chart.    9/2/2020   [facility] EMERGENCY DEPARTMENT     Doris Monroy PA-C  09/02/20 8027

## 2022-01-27 ENCOUNTER — HOSPITAL ENCOUNTER (EMERGENCY)
Facility: CLINIC | Age: 67
Discharge: HOME | End: 2022-01-27
Attending: FAMILY MEDICINE | Admitting: FAMILY MEDICINE
Payer: COMMERCIAL

## 2022-01-27 ENCOUNTER — APPOINTMENT (OUTPATIENT)
Dept: CT IMAGING | Facility: CLINIC | Age: 67
End: 2022-01-27
Attending: FAMILY MEDICINE
Payer: COMMERCIAL

## 2022-01-27 VITALS
OXYGEN SATURATION: 97 % | HEIGHT: 67 IN | TEMPERATURE: 97.6 F | HEART RATE: 99 BPM | BODY MASS INDEX: 28.25 KG/M2 | DIASTOLIC BLOOD PRESSURE: 87 MMHG | SYSTOLIC BLOOD PRESSURE: 148 MMHG | WEIGHT: 180 LBS | RESPIRATION RATE: 21 BRPM

## 2022-01-27 DIAGNOSIS — J96.22 ACUTE ON CHRONIC RESPIRATORY FAILURE WITH HYPERCAPNIA (H): ICD-10-CM

## 2022-01-27 DIAGNOSIS — J44.1 CHRONIC OBSTRUCTIVE PULMONARY DISEASE WITH ACUTE EXACERBATION (H): ICD-10-CM

## 2022-01-27 PROBLEM — F17.213 CIGARETTE NICOTINE DEPENDENCE WITH WITHDRAWAL: Status: ACTIVE | Noted: 2018-12-19

## 2022-01-27 PROBLEM — R73.9 HYPERGLYCEMIA: Status: ACTIVE | Noted: 2019-07-28

## 2022-01-27 PROBLEM — E11.9 TYPE 2 DIABETES MELLITUS WITHOUT COMPLICATION, WITHOUT LONG-TERM CURRENT USE OF INSULIN (H): Status: ACTIVE | Noted: 2019-12-16

## 2022-01-27 PROBLEM — F32.A DEPRESSION: Status: ACTIVE | Noted: 2022-01-27

## 2022-01-27 PROBLEM — Z66 DNR (DO NOT RESUSCITATE): Status: ACTIVE | Noted: 2019-12-16

## 2022-01-27 PROBLEM — R73.03 PREDIABETES: Status: ACTIVE | Noted: 2018-03-04

## 2022-01-27 PROBLEM — K92.1 GASTROINTESTINAL HEMORRHAGE WITH MELENA: Status: ACTIVE | Noted: 2018-12-19

## 2022-01-27 PROBLEM — M54.12 CERVICAL RADICULOPATHY: Status: ACTIVE | Noted: 2017-04-26

## 2022-01-27 PROBLEM — D62 ACUTE BLOOD LOSS ANEMIA: Status: ACTIVE | Noted: 2018-12-19

## 2022-01-27 PROBLEM — J44.9 COPD, VERY SEVERE (H): Status: ACTIVE | Noted: 2018-04-09

## 2022-01-27 PROBLEM — D69.6 THROMBOCYTOPENIA (H): Status: ACTIVE | Noted: 2018-12-18

## 2022-01-27 PROBLEM — R74.8 ELEVATED LIVER ENZYMES: Status: ACTIVE | Noted: 2018-03-04

## 2022-01-27 PROBLEM — N17.9 AKI (ACUTE KIDNEY INJURY) (H): Status: ACTIVE | Noted: 2018-12-18

## 2022-01-27 PROBLEM — Z71.89 OTHER SPECIFIED COUNSELING: Chronic | Status: ACTIVE | Noted: 2019-08-03

## 2022-01-27 PROBLEM — F41.9 ANXIETY: Status: ACTIVE | Noted: 2022-01-27

## 2022-01-27 PROBLEM — K85.90 PANCREATITIS: Status: ACTIVE | Noted: 2018-12-20

## 2022-01-27 LAB
ANION GAP SERPL CALCULATED.3IONS-SCNC: <4 MMOL/L (ref 3–14)
APTT PPP: 30 SECONDS (ref 22–38)
BASE EXCESS BLDV CALC-SCNC: 17.9 MMOL/L (ref -7.7–1.9)
BASE EXCESS BLDV CALC-SCNC: ABNORMAL MMOL/L
BASE EXCESS BLDV CALC-SCNC: ABNORMAL MMOL/L
BASOPHILS # BLD AUTO: 0 10E3/UL (ref 0–0.2)
BASOPHILS NFR BLD AUTO: 0 %
BUN SERPL-MCNC: 19 MG/DL (ref 7–30)
CALCIUM SERPL-MCNC: 9.2 MG/DL (ref 8.5–10.1)
CHLORIDE BLD-SCNC: 88 MMOL/L (ref 94–109)
CO2 SERPL-SCNC: >45 MMOL/L (ref 20–32)
CREAT SERPL-MCNC: 0.68 MG/DL (ref 0.66–1.25)
EOSINOPHIL # BLD AUTO: 0 10E3/UL (ref 0–0.7)
EOSINOPHIL NFR BLD AUTO: 0 %
ERYTHROCYTE [DISTWIDTH] IN BLOOD BY AUTOMATED COUNT: 12.8 % (ref 10–15)
FLUAV RNA SPEC QL NAA+PROBE: NEGATIVE
FLUBV RNA RESP QL NAA+PROBE: NEGATIVE
GFR SERPL CREATININE-BSD FRML MDRD: >90 ML/MIN/1.73M2
GLUCOSE BLD-MCNC: 273 MG/DL (ref 70–99)
GLUCOSE BLDC GLUCOMTR-MCNC: 265 MG/DL (ref 70–99)
HCO3 BLDV-SCNC: 50 MMOL/L (ref 21–28)
HCO3 BLDV-SCNC: ABNORMAL MMOL/L
HCO3 BLDV-SCNC: ABNORMAL MMOL/L
HCT VFR BLD AUTO: 43.6 % (ref 40–53)
HGB BLD-MCNC: 12.7 G/DL (ref 13.3–17.7)
HOLD SPECIMEN: NORMAL
HOLD SPECIMEN: NORMAL
IMM GRANULOCYTES # BLD: 0.1 10E3/UL
IMM GRANULOCYTES NFR BLD: 1 %
INR PPP: 0.99 (ref 0.85–1.15)
LYMPHOCYTES # BLD AUTO: 0.8 10E3/UL (ref 0.8–5.3)
LYMPHOCYTES NFR BLD AUTO: 9 %
MCH RBC QN AUTO: 31.5 PG (ref 26.5–33)
MCHC RBC AUTO-ENTMCNC: 29.1 G/DL (ref 31.5–36.5)
MCV RBC AUTO: 108 FL (ref 78–100)
MONOCYTES # BLD AUTO: 0.6 10E3/UL (ref 0–1.3)
MONOCYTES NFR BLD AUTO: 7 %
NEUTROPHILS # BLD AUTO: 7.8 10E3/UL (ref 1.6–8.3)
NEUTROPHILS NFR BLD AUTO: 83 %
NRBC # BLD AUTO: 0 10E3/UL
NRBC BLD AUTO-RTO: 0 /100
O2/TOTAL GAS SETTING VFR VENT: 50 %
O2/TOTAL GAS SETTING VFR VENT: 6 %
O2/TOTAL GAS SETTING VFR VENT: 60 %
PCO2 BLDV: 124 MM HG (ref 40–50)
PCO2 BLDV: >130 MM HG (ref 40–50)
PCO2 BLDV: >130 MM HG (ref 40–50)
PH BLDV: 7.2 [PH] (ref 7.32–7.43)
PH BLDV: 7.21 [PH] (ref 7.32–7.43)
PH BLDV: 7.22 [PH] (ref 7.32–7.43)
PLATELET # BLD AUTO: 156 10E3/UL (ref 150–450)
PO2 BLDV: 17 MM HG (ref 25–47)
PO2 BLDV: 28 MM HG (ref 25–47)
PO2 BLDV: 46 MM HG (ref 25–47)
POTASSIUM BLD-SCNC: 4.9 MMOL/L (ref 3.4–5.3)
RBC # BLD AUTO: 4.03 10E6/UL (ref 4.4–5.9)
SARS-COV-2 RNA RESP QL NAA+PROBE: NEGATIVE
SODIUM SERPL-SCNC: 137 MMOL/L (ref 133–144)
TROPONIN I SERPL HS-MCNC: 13 NG/L
WBC # BLD AUTO: 9.4 10E3/UL (ref 4–11)

## 2022-01-27 PROCEDURE — 94660 CPAP INITIATION&MGMT: CPT

## 2022-01-27 PROCEDURE — 70498 CT ANGIOGRAPHY NECK: CPT

## 2022-01-27 PROCEDURE — 85730 THROMBOPLASTIN TIME PARTIAL: CPT | Performed by: FAMILY MEDICINE

## 2022-01-27 PROCEDURE — 999N000157 HC STATISTIC RCP TIME EA 10 MIN

## 2022-01-27 PROCEDURE — 85025 COMPLETE CBC W/AUTO DIFF WBC: CPT | Performed by: FAMILY MEDICINE

## 2022-01-27 PROCEDURE — 70496 CT ANGIOGRAPHY HEAD: CPT

## 2022-01-27 PROCEDURE — 250N000011 HC RX IP 250 OP 636: Performed by: FAMILY MEDICINE

## 2022-01-27 PROCEDURE — C9803 HOPD COVID-19 SPEC COLLECT: HCPCS | Performed by: FAMILY MEDICINE

## 2022-01-27 PROCEDURE — 36415 COLL VENOUS BLD VENIPUNCTURE: CPT | Performed by: FAMILY MEDICINE

## 2022-01-27 PROCEDURE — 80048 BASIC METABOLIC PNL TOTAL CA: CPT | Performed by: FAMILY MEDICINE

## 2022-01-27 PROCEDURE — 93005 ELECTROCARDIOGRAM TRACING: CPT | Performed by: FAMILY MEDICINE

## 2022-01-27 PROCEDURE — 84484 ASSAY OF TROPONIN QUANT: CPT | Performed by: FAMILY MEDICINE

## 2022-01-27 PROCEDURE — 96365 THER/PROPH/DIAG IV INF INIT: CPT | Mod: 59 | Performed by: FAMILY MEDICINE

## 2022-01-27 PROCEDURE — 999N000215 HC STATISTIC HFNC ADULT NON-CPAP

## 2022-01-27 PROCEDURE — 96375 TX/PRO/DX INJ NEW DRUG ADDON: CPT | Performed by: FAMILY MEDICINE

## 2022-01-27 PROCEDURE — 999N000123 HC STATISTIC OXYGEN O2DAILY TECH TIME

## 2022-01-27 PROCEDURE — 85610 PROTHROMBIN TIME: CPT | Mod: GZ | Performed by: FAMILY MEDICINE

## 2022-01-27 PROCEDURE — 99292 CRITICAL CARE ADDL 30 MIN: CPT | Mod: 25 | Performed by: FAMILY MEDICINE

## 2022-01-27 PROCEDURE — 87636 SARSCOV2 & INF A&B AMP PRB: CPT | Performed by: FAMILY MEDICINE

## 2022-01-27 PROCEDURE — 71275 CT ANGIOGRAPHY CHEST: CPT

## 2022-01-27 PROCEDURE — 99291 CRITICAL CARE FIRST HOUR: CPT | Mod: 25 | Performed by: FAMILY MEDICINE

## 2022-01-27 PROCEDURE — 93010 ELECTROCARDIOGRAM REPORT: CPT | Performed by: FAMILY MEDICINE

## 2022-01-27 PROCEDURE — 250N000009 HC RX 250: Performed by: FAMILY MEDICINE

## 2022-01-27 PROCEDURE — 70450 CT HEAD/BRAIN W/O DYE: CPT

## 2022-01-27 PROCEDURE — 82803 BLOOD GASES ANY COMBINATION: CPT | Performed by: FAMILY MEDICINE

## 2022-01-27 PROCEDURE — 96366 THER/PROPH/DIAG IV INF ADDON: CPT | Performed by: FAMILY MEDICINE

## 2022-01-27 RX ORDER — IOPAMIDOL 755 MG/ML
78 INJECTION, SOLUTION INTRAVASCULAR ONCE
Status: COMPLETED | OUTPATIENT
Start: 2022-01-27 | End: 2022-01-27

## 2022-01-27 RX ORDER — IOPAMIDOL 755 MG/ML
70 INJECTION, SOLUTION INTRAVASCULAR ONCE
Status: COMPLETED | OUTPATIENT
Start: 2022-01-27 | End: 2022-01-27

## 2022-01-27 RX ORDER — UREA 10 %
500 LOTION (ML) TOPICAL DAILY
COMMUNITY

## 2022-01-27 RX ORDER — METHYLPREDNISOLONE SODIUM SUCCINATE 125 MG/2ML
125 INJECTION, POWDER, LYOPHILIZED, FOR SOLUTION INTRAMUSCULAR; INTRAVENOUS ONCE
Status: COMPLETED | OUTPATIENT
Start: 2022-01-27 | End: 2022-01-27

## 2022-01-27 RX ORDER — LORAZEPAM 1 MG/1
2 TABLET ORAL
COMMUNITY

## 2022-01-27 RX ORDER — LORAZEPAM 2 MG/ML
1 INJECTION INTRAMUSCULAR ONCE
Status: DISCONTINUED | OUTPATIENT
Start: 2022-01-27 | End: 2022-01-27 | Stop reason: HOSPADM

## 2022-01-27 RX ORDER — BUDESONIDE 0.5 MG/2ML
0.5 INHALANT ORAL 2 TIMES DAILY
COMMUNITY

## 2022-01-27 RX ORDER — HYDROXYZINE HYDROCHLORIDE 25 MG/1
25 TABLET, FILM COATED ORAL DAILY PRN
COMMUNITY

## 2022-01-27 RX ORDER — LEVOFLOXACIN 5 MG/ML
750 INJECTION, SOLUTION INTRAVENOUS ONCE
Status: COMPLETED | OUTPATIENT
Start: 2022-01-27 | End: 2022-01-27

## 2022-01-27 RX ORDER — OLANZAPINE 10 MG/2ML
10 INJECTION, POWDER, FOR SOLUTION INTRAMUSCULAR DAILY PRN
Status: DISCONTINUED | OUTPATIENT
Start: 2022-01-27 | End: 2022-01-27 | Stop reason: HOSPADM

## 2022-01-27 RX ADMIN — SODIUM CHLORIDE 100 ML: 9 INJECTION, SOLUTION INTRAVENOUS at 16:16

## 2022-01-27 RX ADMIN — IOPAMIDOL 70 ML: 755 INJECTION, SOLUTION INTRAVENOUS at 16:16

## 2022-01-27 RX ADMIN — SODIUM CHLORIDE 100 ML: 9 INJECTION, SOLUTION INTRAVENOUS at 18:09

## 2022-01-27 RX ADMIN — LEVOFLOXACIN 750 MG: 750 INJECTION, SOLUTION INTRAVENOUS at 18:20

## 2022-01-27 RX ADMIN — IOPAMIDOL 78 ML: 755 INJECTION, SOLUTION INTRAVENOUS at 18:08

## 2022-01-27 RX ADMIN — METHYLPREDNISOLONE SODIUM SUCCINATE 125 MG: 125 INJECTION, POWDER, FOR SOLUTION INTRAMUSCULAR; INTRAVENOUS at 16:58

## 2022-01-27 ASSESSMENT — MIFFLIN-ST. JEOR: SCORE: 1565.1

## 2022-01-27 ASSESSMENT — ACTIVITIES OF DAILY LIVING (ADL)
ADLS_ACUITY_SCORE: 12
ADLS_ACUITY_SCORE: 12

## 2022-01-27 NOTE — CONSULTS
[facility]    Stroke Telephone Note    I was called by Edison Jones on 01/27/22 regarding patient Brooks Loo. The patient is a 66 year old male with past medical history significant for end stage COPD, alcohol and tobacco abuse, DM and resides in a group home. Per chart review, he was previous on hospice but survived longer than anticipated. A recent PCP note documents that the patient was interested in re-enrolling in hospice but did not qualify at that time. He awoke from a nap at 1500 and was noted to be confused. On EMS arrival he was conversant but dysarthric. En route, his oxygen saturation dropped to the low 80s. He was noted to possibly have some right arm twitching. Per ED provider, on arrival he was dysarthric and uncooperative on neuro exam but witnessed moving all extremities equally.  PCO2 is >130 on VBG.    Stroke Code Data (for stroke code without tele)  Stroke code activated 01/27/22   1610   Stroke provider first response           01/27/22   1613   Last known normal          Unknown   Time of discovery   (or onset of symptoms) 01/27/22   1500   Head CT read by Stroke Neuro Dr/Provider 01/27/22   1622   Was stroke code de-escalated? Yes 01/27/22 1635  patient is outside emergent treatment time parameters       Imaging Findings   CTH negative for acute pathology  CTA head/neck without significant stenosis or occlusion    Thrombolytic Treatment   Not given due to unknown LKW and stroke mimic: hypoxia.    Endovascular Treatment  Not initiated due to absence of proximal vessel occlusion    Impression  Confusion and dysarthria in the setting of hypoxia. No clear focal neurologic deficits per ED report. Possible report of right arm twitching en route but no known seizure history, and patient noted to be more alert on arrival which would be atypical for seizure.     Recommendations   - MRI brain if symptoms persist after correction of metabolic/respiratory  "derangements or concern for focal deficits  - Infectious/metabolic workup per ED    My recommendations are based on the information provided over the phone by Brooks Loo's in-person providers. They are not intended to replace the clinical judgment of his in-person providers. I was not requested to personally see or examine the patient at this time.    Discussed with stroke attending Dr. Murrell.    GUERDA Berry, [facility]  Neurology  To page me or covering stroke neurology team member, click here: AMCOM   Choose \"On Call\" tab at top, then search dropdown box for \"Neurology Adult\", select location, press Enter, then look for stroke/neuro ICU/telestroke.           "

## 2022-01-27 NOTE — ED PROVIDER NOTES
History     Chief Complaint   Patient presents with     Altered Mental Status     pt brought in via EMS from [facility] in [place] for altered mental status that began at 1541.     Right-sided weakness, hypoxic  HPI  Brooks Loo is a 66 year old male, past medical history is significant for anxiety, depression, DNR, type 2 diabetes, hyperglycemia, pancreatitis, nicotine dependence, GI bleed, thrombocytopenia, COPD severe on 6 L of O2 24/7, alcohol dependence in remission, hyperbilirubinemia, hyponatremia, ascending aortic dilatation, bicuspid aortic valve, tobacco use disorder, hypertension, presents to the emergency department by EMS with initial complaint radiate in by ambulance for right-sided weakness and hypoxia.  History obtained from EMS at the bedside with the patient alert and responsive although minimally cooperative identifies that they were called for altered mental status when the patient awoke from a nap at around 3:00.  There is no known last well time.  EMS arrived to find the patient fairly conversant and seemed appropriate but then his speech became more garbled and they decided to transport him.  In route he became more drowsy less responsive to their questions and dropped his oxygen saturation to the low 80s.  They thought they witnessed perhaps some twitching movements of his right upper extremity.  Upon arrival here in the emergency department the patient was more alert was actively looking around the department when I saw him come in from the ambulance bay on the stretcher.  He answers questions, speech is somewhat garbled but I do not know his baseline, 2 or 3 words.  Does not identify any pain in his head chest or abdomen.  No paresthesias.  He was uncooperative with the upper extremity neurologic exam however I witnessed him spontaneously move both arms without assistance when he was transferred from the ambulance gurney to the bed in room 1.  Code stroke was called and the patient  "taken for CT imaging and I spoke with the stroke neurologist.    Allergies:  Allergies   Allergen Reactions     Cephalexin      Duoneb      Prednisone        Problem List:    Patient Active Problem List    Diagnosis Date Noted     Anxiety 01/27/2022     Priority: Medium     \"A little bit\"       Depression 01/27/2022     Priority: Medium     \"A little bit\"       Chronic obstructive pulmonary disease with acute exacerbation (H) 01/27/2022     Priority: Medium     Acute on chronic respiratory failure with hypercapnia (H) 01/27/2022     Priority: Medium     DNR (do not resuscitate) 12/16/2019     Priority: Medium     Enrolled in hospice 8/2019.  Clinically improved so discharged from hospice, but continues to express desire for comfort cares only, do not hospitalize.       Type 2 diabetes mellitus without complication, without long-term current use of insulin (H) 12/16/2019     Priority: Medium     Other specified counseling 08/03/2019     Priority: Medium     Hospice Physician Narrative    Primary hospice diagnosis: End stage COPD  Complicating medical conditions (comorbid/secondary): Oxygen and steroid dependence, respiratory failure, anxiety/depression, pneumonia, steroid-induced hyperglycemia  3rd/unrelated category: Pancreatitis, hypertension. These diagnoses do not affect pt's prognosis as they are stable and/or are easily managed and are unrelated to reasons/diagnoses for which pt enrolled in hospice.    Physician narrative: Mr. Brooks Loo is a 64 years old gentleman with severe COPD. He has been hospitalized for respiratory issues twice in less than a month. He has aggressively been treated for COPD exacerbation and pneumonia. He continues to suffer from significant shortness of breath at rest and with any activity. He also suffers from lower extremity edema and I wonder if he has right-sided heart failure. This is despite high levels of supplemental oxygen. His blood sugars have been affected with altering " steroid doses as well. He no longer wants to be hospitalized. Brooks Loo currently requires assistance with dressing and bathing lower part of body, ambulation (only 50 feet). PPS 50%. He desires no further antibiotics. Prognosis is poor due to end stage COPD.    I, Karyna Zavala MD, attest that by signing this document I have composed the above narrative based on my review of this patient's available medical record and discussion with the hospice admission nurse. In my professional judgement, based on illness and decline, this patient is terminally ill and prognosis is less than six months if the disease runs its natural course.    Karyna Zavala MD ....................  8/3/2019   1:07 PM  Patient has identified Health Care Agent(s): No  Add Health Care Agents: No  Patient has Advance Care Plan Documents (Health Care Directive, POLST): Yes    Advance Care Plan Documents:  POLST Form     Patient has identified Specific Treatment Preferences: Yes     How have preferences been verified: POLST    Specific Treatment Preferences:   a.) Code Status:  DNR/ Do Not Attempt Resuscitation - Allow a Natural Death  b.) Goals of Treatment:   iii. Comfort-Focused Treatment (Allow Natural Death): Relieve pain and suffering through the use of any medication by any route, positioning, wound care and other measures. Use oxygen, suction and manual treatment of airway obstruction as needed for comfort. Patient prefers no transfer to hospital for life-sustaining treatments. Transfer if comfort needs cannot be met in current location.  TREATMENT PLAN: Maximize comfort through symptom management.  c.) Interventions and Treatments:  i.  Artificially Administered Nutrition and Hydration: - No artificial nutrition/hydration by tube  ii.  Antibiotics: - NO antibiotics. Use other methods to relieve symptoms when possible    DNR/DNI, No tube feedings, Comfort care       Hyperglycemia 07/28/2019     Priority: Medium      "Pancreatitis 12/20/2018     Priority: Medium     Acute blood loss anemia 12/19/2018     Priority: Medium     Cigarette nicotine dependence with withdrawal 12/19/2018     Priority: Medium     Gastrointestinal hemorrhage with melena 12/19/2018     Priority: Medium     GRISEL (acute kidney injury) (H) 12/18/2018     Priority: Medium     Thrombocytopenia (H) 12/18/2018     Priority: Medium     COPD, very severe (H) 04/09/2018     Priority: Medium     Elevated liver enzymes 03/04/2018     Priority: Medium     Prediabetes 03/04/2018     Priority: Medium     Cervical radiculopathy 04/26/2017     Priority: Medium     Macrocytic anemia 08/15/2016     Priority: Medium     Alcohol dependence in remission (H) 07/20/2016     Priority: Medium     Cough syncope syndrome 07/19/2016     Priority: Medium     Hyperbilirubinemia 05/04/2016     Priority: Medium     Hyponatremia 05/03/2016     Priority: Medium     Hyperlipidemia, unspecified 05/31/2015     Priority: Medium     High total and LDL in the past, similar results on recent recheck.   Very high HDL as well, 79.   Cardiovascular risk 13% by AHA calculator and 25% by [place]   Pt not interested in medication after discussion of guideline recommendations today  3-15       Scoliosis 05/31/2015     Priority: Medium     There is scoliosis of the spine. There is a spinal ira in place in the thoracic spine. 12-13       Ascending aorta dilatation (H) 04/23/2014     Priority: Medium     Dilated aortic root and proximal ascending aorta measuring 4.55 cm and 3.9 cm, respectively.  4-14  Stable 7/2016       Bicuspid aortic valve 04/23/2014     Priority: Medium     Bicuspid aortic valve without significant stenosis or insufficiency.  4-14       Tobacco use disorder 09/28/2011     Priority: Medium     Did not start Chantix prescribed 4/2014 but \"may\" start it.   The patient was urged to quit smoking. Direct health benefits were discussed. Counseling provided.  3-15       Hypertension, " "essential 05/26/2010     Priority: Medium        Past Medical History:    No past medical history on file.    Past Surgical History:    No past surgical history on file.    Family History:    No family history on file.    Social History:  Marital Status:  Single [1]  Social History     Tobacco Use     Smoking status: Not on file     Smokeless tobacco: Not on file   Substance Use Topics     Alcohol use: Not on file     Drug use: Not on file        Medications:    acetaminophen (TYLENOL) 500 MG tablet  albuterol (PROAIR HFA/PROVENTIL HFA/VENTOLIN HFA) 108 (90 Base) MCG/ACT inhaler  budesonide (PULMICORT) 0.5 MG/2ML neb solution  cyanocobalamin (VITAMIN B-12) 500 MCG tablet  fluticasone-vilanterol (BREO ELLIPTA) 200-25 MCG/INH inhaler  furosemide (LASIX) 40 MG tablet  hydrOXYzine (ATARAX) 25 MG tablet  ipratropium - albuterol 0.5 mg/2.5 mg/3 mL (DUONEB) 0.5-2.5 (3) MG/3ML neb solution  lisinopril (PRINIVIL/ZESTRIL) 40 MG tablet  LORazepam (ATIVAN) 1 MG tablet  metFORMIN (GLUCOPHAGE) 500 MG tablet  nicotine (NICOTROL) 10 MG inhaler  tiotropium (SPIRIVA RESPIMAT) 2.5 MCG/ACT inhaler  albuterol (PROAIR HFA/PROVENTIL HFA/VENTOLIN HFA) 108 (90 Base) MCG/ACT inhaler  bisacodyl (DULCOLAX) 10 MG suppository  fexofenadine (ALLEGRA) 180 MG tablet  guaiFENesin (MUCINEX) 600 MG 12 hr tablet  indomethacin (INDOCIN) 50 MG capsule  insulin aspart (NOVOLOG FLEXPEN) 100 UNIT/ML pen  insulin aspart (NOVOLOG FLEXPEN) 100 UNIT/ML pen  insulin glargine (LANTUS PEN) 100 UNIT/ML pen  magnesium hydroxide (MILK OF MAGNESIA) 400 MG/5ML suspension  melatonin 5 MG tablet  morphine sulfate 20 MG/5ML SOLN  traZODone (DESYREL) 50 MG tablet          Review of Systems   All other systems reviewed and are negative.      Physical Exam   BP: 137/77  Pulse: 111  Temp: 97.6  F (36.4  C)  Resp: 18  Height: 170.2 cm (5' 7\")  Weight: 81.6 kg (180 lb)  SpO2: 96 %      Physical Exam  Vitals and nursing note reviewed.   Constitutional:       General: He is not " in acute distress.     Appearance: He is well-developed and normal weight. He is not ill-appearing.      Comments: Speech is somewhat dysarthric, the patient is minimally cooperative with the exam.  He would not participate with the upper extremity neurologic exam on either side.  There was no asymmetry and he was cooperative with lower extremity exam.   HENT:      Head: Normocephalic and atraumatic.      Mouth/Throat:      Mouth: Mucous membranes are moist.      Pharynx: Oropharynx is clear.   Eyes:      Extraocular Movements: Extraocular movements intact.      Pupils: Pupils are equal, round, and reactive to light.   Cardiovascular:      Rate and Rhythm: Normal rate and regular rhythm.      Heart sounds: Normal heart sounds.   Pulmonary:      Effort: Pulmonary effort is normal.      Comments: Poor air entry throughout.  Abdominal:      General: Bowel sounds are normal.      Palpations: Abdomen is soft.   Musculoskeletal:         General: Normal range of motion.      Cervical back: Normal range of motion and neck supple.   Skin:     General: Skin is warm and dry.      Capillary Refill: Capillary refill takes less than 2 seconds.   Neurological:      Mental Status: He is alert.      GCS: GCS eye subscore is 4. GCS verbal subscore is 5. GCS motor subscore is 6.      Cranial Nerves: Cranial nerves are intact.      Deep Tendon Reflexes:      Reflex Scores:       Tricep reflexes are 2+ on the right side and 2+ on the left side.       Bicep reflexes are 2+ on the right side and 2+ on the left side.       Brachioradialis reflexes are 2+ on the right side and 2+ on the left side.       Patellar reflexes are 3+ on the right side and 3+ on the left side.       Achilles reflexes are 3+ on the right side and 3+ on the left side.        ED Course        National Institutes of Health Stroke Scale  Exam Interval: Baseline   Score    Level of consciousness: (0)   Alert, keenly responsive    LOC questions: (0)   Answers both  questions correctly    LOC commands: (0)   Performs both tasks correctly    Best gaze: (0)   Normal    Visual: (0)   No visual loss    Facial palsy: (0)   Normal symmetrical movements    Motor arm (left): (0)   No drift    Motor arm (right): (0)   No drift    Motor leg (left): (0)   No drift    Motor leg (right): (0)   No drift    Limb ataxia: (0)   Absent    Sensory: (0)   Normal- no sensory loss    Best language: (0)   Normal- no aphasia    Dysarthria: (1)   Mild to moderate dysarthria    Extinction and inattention: (0)   No abnormality        Total Score:  1              Procedures              EKG Interpretation:      Interpreted by Edison Jones MD  Time reviewed: Time obtained 1634 time interpreted same 110 bpm sinus tachycardia, right axis deviation, poor R wave progression, no definite acute ischemia or infarct.        Critical Care time:  was 48 minutes for this patient excluding procedures.               Results for orders placed or performed during the hospital encounter of 01/27/22 (from the past 24 hour(s))   Glucose by meter   Result Value Ref Range    GLUCOSE BY METER POCT 265 (H) 70 - 99 mg/dL   CBC with Platelets & Differential    Narrative    The following orders were created for panel order CBC with Platelets & Differential.  Procedure                               Abnormality         Status                     ---------                               -----------         ------                     CBC with platelets and d...[[id number]]  Abnormal            Final result                 Please view results for these tests on the individual orders.   Basic metabolic panel   Result Value Ref Range    Sodium 137 133 - 144 mmol/L    Potassium 4.9 3.4 - 5.3 mmol/L    Chloride 88 (L) 94 - 109 mmol/L    Carbon Dioxide (CO2) >45 (HH) 20 - 32 mmol/L    Anion Gap <4 3 - 14 mmol/L    Urea Nitrogen 19 7 - 30 mg/dL    Creatinine 0.68 0.66 - 1.25 mg/dL    Calcium 9.2 8.5 - 10.1 mg/dL    Glucose 273 (H) 70  - 99 mg/dL    GFR Estimate >90 >60 mL/min/1.73m2   INR   Result Value Ref Range    INR 0.99 0.85 - 1.15   Partial thromboplastin time   Result Value Ref Range    aPTT 30 22 - 38 Seconds   Troponin I   Result Value Ref Range    Troponin I High Sensitivity 13 <79 ng/L   CBC with platelets and differential   Result Value Ref Range    WBC Count 9.4 4.0 - 11.0 10e3/uL    RBC Count 4.03 (L) 4.40 - 5.90 10e6/uL    Hemoglobin 12.7 (L) 13.3 - 17.7 g/dL    Hematocrit 43.6 40.0 - 53.0 %     (H) 78 - 100 fL    MCH 31.5 26.5 - 33.0 pg    MCHC 29.1 (L) 31.5 - 36.5 g/dL    RDW 12.8 10.0 - 15.0 %    Platelet Count 156 150 - 450 10e3/uL    % Neutrophils 83 %    % Lymphocytes 9 %    % Monocytes 7 %    % Eosinophils 0 %    % Basophils 0 %    % Immature Granulocytes 1 %    NRBCs per 100 WBC 0 <1 /100    Absolute Neutrophils 7.8 1.6 - 8.3 10e3/uL    Absolute Lymphocytes 0.8 0.8 - 5.3 10e3/uL    Absolute Monocytes 0.6 0.0 - 1.3 10e3/uL    Absolute Eosinophils 0.0 0.0 - 0.7 10e3/uL    Absolute Basophils 0.0 0.0 - 0.2 10e3/uL    Absolute Immature Granulocytes 0.1 <=0.4 10e3/uL    Absolute NRBCs 0.0 10e3/uL   Symptomatic; Unknown Influenza A/B & SARS-CoV2 (COVID-19) Virus PCR Multiplex Nasopharyngeal    Specimen: Nasopharyngeal; Swab   Result Value Ref Range    Influenza A PCR Negative Negative    Influenza B PCR Negative Negative    SARS CoV2 PCR Negative Negative    Narrative    Testing was performed using the [employer] SARS-CoV-2 & Influenza A/B Assay on the [employer] System. This test should be ordered for the detection of SARS-CoV-2 and influenza viruses in individuals who meet clinical and/or epidemiological criteria. Test performance is unknown in asymptomatic patients. This test is for in vitro diagnostic use under the FDA EUA for laboratories certified under CLIA to perform moderate and/or high complexity testing. This test has not been FDA cleared or approved. A negative result does not rule out the presence of PCR inhibitors  in the specimen or target RNA in concentration below the limit of detection for the assay. If only one viral target is positive but coinfection with multiple targets is suspected, the sample should be re-tested with another FDA cleared, approved or authorized test, if coinfection would change clinical management. [facility] are certified under the Clinical Laboratory Improvement Amendments of 1988 (CLIA-88) as  qualified to perform moderate and/or high complexity laboratory testing.   [place] Draw    Narrative    The following orders were created for panel order [place] Draw.  Procedure                               Abnormality         Status                     ---------                               -----------         ------                     Extra Red Top Tube[[id number]]                               Final result               Extra Green Top (Lithium...[[id number]]                      Final result                 Please view results for these tests on the individual orders.   Blood gas venous   Result Value Ref Range    pH Venous 7.20 (L) 7.32 - 7.43    pCO2 Venous >130 (HH) 40 - 50 mm Hg    pO2 Venous 46 25 - 47 mm Hg    Bicarbonate Venous      Base Excess/Deficit (+/-)      FIO2 6    Extra Red Top Tube   Result Value Ref Range    Hold Specimen JIC    Extra Green Top (Lithium Heparin) Tube   Result Value Ref Range    Hold Specimen JIC    CT Head w/o Contrast    Narrative    CT SCAN OF THE HEAD WITHOUT CONTRAST   1/27/2022 4:20 PM     HISTORY: Right-sided weakness, garbled speech    TECHNIQUE:  Axial images of the head and coronal reformations without  IV contrast material. Radiation dose for this scan was reduced using  automated exposure control, adjustment of the mA and/or kV according  to patient size, or iterative reconstruction technique.    COMPARISON: None.    FINDINGS:      Motion degraded examination, which limits evaluation.    The brain parenchymal volume and ventricular size  are appropriate for  age. No secondary features of increased intraventricular or  intracranial pressure. No significant white matter changes. No  territorial gray-white differentiation loss or obscuration of the  basal ganglia to suggest acute/subacute infarction.No intracranial  hemorrhage or extra-axial fluid collection. No evidence for mass, mass  effect, shift of midline or basal cistern effacement.  The midline  structures and the posterior fossa structures are normal.    The paranasal sinuses are clear. Temporal bone structures are well  aerated. No acute osseous abnormality. The orbits are unremarkable..      Impression    IMPRESSION:      1. No acute intracranial process.    DENIS PETERS MD         SYSTEM ID:  [id number]   CTA Head Neck with Contrast    Narrative    CTA  HEAD NECK WITH CONTRAST 1/27/2022 4:29 PM    INDICATION: Confusion, unresponsiveness. Right-sided weakness.  TECHNIQUE: Head and neck CT angiogram with IV contrast. CT images of  the head and neck vessels obtained during the arterial phase of  intravenous contrast administration. Axial helical 2D reconstructed  images and multiplanar 3D MIP reconstructed images of the head and  neck vessels were performed on a separate workstation. Dose reduction  techniques were used.  CONTRAST: 70 mL Isovue 370  COMPARISON: Head CT 1/27/2022     FINDINGS:  HEAD CTA: Mild carotid siphon atherosclerosis. The intracranial  circulation is patent without evidence for aneurysm, proximal vessel  occlusion, high-grade intracranial stenosis or arteriovenous  malformation.  Patent dural venous sinuses. Advanced dental disease.  Emphysema.    NECK CTA: Three vessel arch.  Carotid arteries are patent with mild  atherosclerotic change.  No hemodynamically significant stenosis by  NASCET criteria in either carotid system.  Patent vertebral arteries.  No dissection.      Impression    IMPRESSION:  HEAD CTA:  1.  Mild carotid siphon atherosclerosis.. No vessel  stenosis,  occlusion or aneurysm.    NECK CTA:  1.  Mild carotid artery atherosclerosis. No dissection or  hemodynamically significant narrowing in the neck by NASCET criteria.    Findings were discussed with Dr. URSZULA NAVARRETE via telephone at  1643 hours on 1/27/2022.    JERRY BEAR MD         SYSTEM ID:  [id number]   Blood gas venous   Result Value Ref Range    pH Venous 7.22 (L) 7.32 - 7.43    pCO2 Venous 124 (HH) 40 - 50 mm Hg    pO2 Venous 28 25 - 47 mm Hg    Bicarbonate Venous 50 (HH) 21 - 28 mmol/L    Base Excess/Deficit (+/-) 17.9 (H) -7.7 - 1.9 mmol/L    FIO2 50    CT Chest Pulmonary Embolism w Contrast    Narrative    EXAM: CT CHEST PULMONARY EMBOLISM W CONTRAST  LOCATION: [facility]  DATE/TIME: 1/27/2022 6:07 PM    INDICATION: Hypercapnic respiratory failure, COPD exacerbation, rule out pulmonary embolism.  COMPARISON: None.  TECHNIQUE: CT chest pulmonary angiogram during arterial phase injection of IV contrast. Multiplanar reformats and MIP reconstructions were performed. Dose reduction techniques were used.   CONTRAST: 78 ml Isovue 370    FINDINGS:  ANGIOGRAM CHEST: No pulmonary embolism. Mild enlargement of central pulmonary arteries compatible with element of pulmonary arterial hypertension. Mild ascending aortic aneurysm measuring 4.1 cm. No thoracic aortic dissection.    LUNGS AND PLEURA: Emphysematous change.    MEDIASTINUM/AXILLAE: Normal.    CORONARY ARTERY CALCIFICATION: Mild.    UPPER ABDOMEN: Calcified gallstones. A few tiny hepatic cysts.    MUSCULOSKELETAL: Scoliosis with ira fixation.      Impression    IMPRESSION:  1.  No pulmonary embolism.    2.  Emphysematous change. Mild enlargement of the central pulmonary arteries compatible with an element of pulmonary arterial hypertension.    3.  Mild aneurysmal dilatation ascending aorta measuring 4.1 cm. No thoracic aortic dissection.    4.  Cholelithiasis.    5.  Scoliosis with thoracic ira fixation.    Blood gas venous   Result Value Ref Range    pH Venous 7.21 (L) 7.32 - 7.43    pCO2 Venous >130 (HH) 40 - 50 mm Hg    pO2 Venous 17 (L) 25 - 47 mm Hg    Bicarbonate Venous      Base Excess/Deficit (+/-)      FIO2 60    4:42 PM  The patient is documented DNR in his chart. Confirmed in the room at the bedside with him that that is consistent with his wishes. We discussed his markedly elevated CO2 of 130 reported on his recent VBG at the time of arrival. His head CT and CTA are both read as negative per radiology verbally and have also been reviewed by stroke neurology who are also aware of his CO2 and recommended de-escalating the code stroke. I discussed the concern with his CO2 being elevated at greater than 130, that we could try BiPAP to try to reduce his CO2; he understands what this means and he would like to try it.  He understands that this does not work that he would need to be intubated. Intubation was explained to him and he does not want intubation. Even though the patient CO2 is elevated I believe that he understands exactly what I am asking him and explaining to him. He understands that if he does not want to be intubated and BiPAP is not working that he would likely proceed to respiratory if not cardiac arrest. He does not want to be intubated.  5:47 PM  Modest improvement with the repeat VBG however directionally correct.  Continue with the BiPAP.  Next gas will be around 730.  Looking for a bed for this patient, nothing available at this time.  7:26 PM  Discussed patient with Dr. Ashko Laureano for the hospitalist service when an ICU bed became available.  Will accept the patient as DNR/DNI to the ICU on BiPAP.  7:38 PM  Called to the patient's room as he is agitated and does not like the BiPAP and wants to take it off. We agreed to try some sedation with some Ativan and also Zyprexa so that he is more relaxed and comfortable with the BiPAP. I ensured him that this was helping him. Repeat blood gas was  done.  7:55 PM  The patient's repeat gas is actually looking worse, pretty much back to baseline where he started before the BiPAP.  Return to the room and discussed this further with him he is adamant that he does not want BiPAP and does not want to be here he realizes that he is going to die and wants to just go back to his home and die. The patient understands that he is leaving AGAINST MEDICAL ADVICE.    Medications   LORazepam (ATIVAN) injection 1 mg (has no administration in time range)   OLANZapine (zyPREXA) injection 10 mg (has no administration in time range)   iopamidol (ISOVUE-370) solution 70 mL (70 mLs Intravenous Given 1/27/22 1616)   sodium chloride 0.9 % bag 500mL for CT scan flush use (100 mLs Intravenous Given 1/27/22 1616)   methylPREDNISolone sodium succinate (solu-MEDROL) injection 125 mg (125 mg Intravenous Given 1/27/22 1658)   levofloxacin (LEVAQUIN) infusion 750 mg (750 mg Intravenous New Bag 1/27/22 1820)   iopamidol (ISOVUE-370) solution 78 mL (78 mLs Intravenous Given 1/27/22 1808)   sodium chloride 0.9 % bag 500mL for CT scan flush use (100 mLs Intravenous Given 1/27/22 1809)       Assessments & Plan (with Medical Decision Making)   66-year-old male past medical history reviewed as above who arrives by EMS with concerns of altered mental status, possible right-sided weakness, hypoxia, possible seizure activity as discussed in the HPI and documented with respect abnormals on exam.  The patient was initially managed as a code stroke given the possibility of CVA by way of his history and altered mental status.  Unfortunately no known last well time.  Stroke neurology was initially involved in the evaluation of the patient and signed off after the CT and CTA were reviewed, de-escalating the code stroke.  The patient was hypercapnic, and while he improved with BiPAP at least transiently, also worsened while on it subsequently.  The patient is previously documented as DNR, I clarified with him  that he may require intubation if he does not respond to BiPAP.  He is adamant that he does not want that and felt that it had been something that he had addressed previously.  He agreed to BiPAP, antibiotics, fluids initially and then during the course of his evaluation and proposed admission to hospital decided that he did not want to be here, did not want BiPAP, was comfortable allowing nature to take its course and that he be allowed to discharge AGAINST MEDICAL ADVICE on 6 L O2 nasal cannula which is his usual baseline and to return to his place of residence.  Please see the multiple time stamps above, the patient was discussed with her hospitalist and the original plan was to admit the patient to the ICU on BiPAP.  Although the patient is still significantly hypercapnic, he was alert, appeared to be mentating appropriately and I believe has the capacity to make this decision.  Disposition is AGAINST MEDICAL ADVICE to home.    Disclaimer: This note consists of symbols derived from keyboarding, dictation and/or voice recognition software. As a result, there may be errors in the script that have gone undetected. Please consider this when interpreting information found in this chart.        I have reviewed the nursing notes.    I have reviewed the findings, diagnosis, plan and need for follow up with the patient.       New Prescriptions    No medications on file       Final diagnoses:   Chronic obstructive pulmonary disease with acute exacerbation (H)   Acute on chronic respiratory failure with hypercapnia (H)       1/27/2022   [facility] EMERGENCY DEPT     Edison Jones MD  01/30/22 0420

## 2022-01-28 ENCOUNTER — PATIENT OUTREACH (OUTPATIENT)
Dept: CARE COORDINATION | Facility: CLINIC | Age: 67
End: 2022-01-28
Payer: COMMERCIAL

## 2022-01-28 DIAGNOSIS — Z71.89 OTHER SPECIFIED COUNSELING: ICD-10-CM

## 2022-01-28 NOTE — PROGRESS NOTES
Clinic Care Coordination Contact    Background: Care Coordination referral placed from [facility] discharge report for reason of patient meeting criteria for a TCM outreach call by [facility] team.    Assessment: Upon chart review, [facility] Team member will cancel/close the referral for TCM outreach due to reason below:    Patient declined completing post hospital discharge questions    Plan: Care Coordination referral for TCM outreach canceled.    Sakshi Giang MA  [facility]

## 2022-01-28 NOTE — DISCHARGE INSTRUCTIONS
You are leaving AGAINST MEDICAL ADVICE.  You realize that doing so with your advanced lung disease has the potential for significant consequences including cardiac and respiratory arrest.  You are welcome to return to the emergency department at anytime if you change your mind.